# Patient Record
Sex: MALE | Race: WHITE | Employment: OTHER | URBAN - METROPOLITAN AREA
[De-identification: names, ages, dates, MRNs, and addresses within clinical notes are randomized per-mention and may not be internally consistent; named-entity substitution may affect disease eponyms.]

---

## 2017-01-02 ENCOUNTER — HOSPITAL ENCOUNTER (OUTPATIENT)
Dept: MRI IMAGING | Age: 42
Discharge: HOME | End: 2017-01-02
Attending: PHYSICIAN ASSISTANT
Payer: COMMERCIAL

## 2017-01-02 DIAGNOSIS — M48.061 LUMBAR FORAMINAL STENOSIS: ICD-10-CM

## 2017-01-02 DIAGNOSIS — C92.10 CML (CHRONIC MYELOID LEUKEMIA) (HCC): ICD-10-CM

## 2017-01-02 PROCEDURE — A9575 INJ GADOTERATE MEGLUMI 0.1ML: HCPCS | Performed by: PHYSICIAN ASSISTANT

## 2017-01-02 PROCEDURE — 72158 MRI LUMBAR SPINE W/O & W/DYE: CPT

## 2017-01-26 ENCOUNTER — TELEPHONE (OUTPATIENT)
Dept: FAMILY MEDICINE CLINIC | Facility: CLINIC | Age: 42
End: 2017-01-26

## 2017-01-26 NOTE — TELEPHONE ENCOUNTER
Patient called. Patient was unaware that he had an appt scheduled with spine surgeon's office. Per last MRI, it stated patient had upcoming appt already scheduled. Pt states he has no appt scheduled yet.  Informed patient he needs to call them and schedule

## 2017-02-20 PROBLEM — M51.369 BULGE OF LUMBAR DISC WITHOUT MYELOPATHY: Status: ACTIVE | Noted: 2017-02-20

## 2017-02-20 PROBLEM — M51.26 BULGE OF LUMBAR DISC WITHOUT MYELOPATHY: Status: ACTIVE | Noted: 2017-02-20

## 2017-02-20 PROBLEM — M54.16 LUMBAR RADICULITIS: Status: ACTIVE | Noted: 2017-02-20

## 2017-02-20 PROBLEM — M51.369 DDD (DEGENERATIVE DISC DISEASE), LUMBAR: Status: ACTIVE | Noted: 2017-02-20

## 2017-02-20 PROBLEM — M51.36 DDD (DEGENERATIVE DISC DISEASE), LUMBAR: Status: ACTIVE | Noted: 2017-02-20

## 2017-02-20 PROBLEM — M51.36 BULGE OF LUMBAR DISC WITHOUT MYELOPATHY: Status: ACTIVE | Noted: 2017-02-20

## 2017-02-27 ENCOUNTER — HOSPITAL ENCOUNTER (OUTPATIENT)
Age: 42
Discharge: HOME | End: 2017-02-27
Attending: FAMILY MEDICINE
Payer: COMMERCIAL

## 2017-02-27 ENCOUNTER — APPOINTMENT (OUTPATIENT)
Dept: GENERAL RADIOLOGY | Age: 42
End: 2017-02-27
Attending: FAMILY MEDICINE
Payer: COMMERCIAL

## 2017-02-27 VITALS
RESPIRATION RATE: 18 BRPM | TEMPERATURE: 100 F | OXYGEN SATURATION: 98 % | DIASTOLIC BLOOD PRESSURE: 83 MMHG | SYSTOLIC BLOOD PRESSURE: 132 MMHG | HEART RATE: 80 BPM

## 2017-02-27 DIAGNOSIS — S93.401A MODERATE ANKLE SPRAIN, RIGHT, INITIAL ENCOUNTER: Primary | ICD-10-CM

## 2017-02-27 PROCEDURE — 99204 OFFICE O/P NEW MOD 45 MIN: CPT

## 2017-02-27 PROCEDURE — 99213 OFFICE O/P EST LOW 20 MIN: CPT

## 2017-02-27 PROCEDURE — 73610 X-RAY EXAM OF ANKLE: CPT

## 2017-02-27 RX ORDER — IBUPROFEN 600 MG/1
600 TABLET ORAL EVERY 8 HOURS PRN
Qty: 30 TABLET | Refills: 0 | Status: SHIPPED | OUTPATIENT
Start: 2017-02-27 | End: 2017-03-06

## 2017-02-27 NOTE — ED INITIAL ASSESSMENT (HPI)
Pt arrived alert and responsive. Pt c/o rolled right ankle. Pt c/o pain, swelling and bruising right foot/ankle.

## 2017-02-27 NOTE — ED PROVIDER NOTES
Patient presents with:  Lower Extremity Injury (musculoskeletal)      HPI:     Derek Gonzalez is a 39year old male who presents today with a chief complaint of pain in the right lateral ankle  pain after twisting injury.  Onset of symptoms was abrupt s right ankle x1day Pt arrived alert and responsive. Pt c/o rolled right ankle. Pt c/o pain,    swelling and bruising right foot/ankle.      Order Specific Question: What is the Relevant Clinical Indication / Reason for Exam?  Answer: rolled right ankle x1day MD KARINE  [address] [phone]    In 1 week

## 2017-04-05 ENCOUNTER — OFFICE VISIT (OUTPATIENT)
Dept: HEMATOLOGY/ONCOLOGY | Age: 42
End: 2017-04-05
Attending: SPECIALIST
Payer: COMMERCIAL

## 2017-04-05 ENCOUNTER — HOSPITAL ENCOUNTER (OUTPATIENT)
Dept: PHYSICAL THERAPY | Facility: HOSPITAL | Age: 42
Setting detail: THERAPIES SERIES
Discharge: HOME | End: 2017-04-05
Attending: PHYSICIAN ASSISTANT
Payer: COMMERCIAL

## 2017-04-05 VITALS
SYSTOLIC BLOOD PRESSURE: 128 MMHG | WEIGHT: 146.81 LBS | HEART RATE: 81 BPM | TEMPERATURE: 98 F | RESPIRATION RATE: 18 BRPM | OXYGEN SATURATION: 98 % | BODY MASS INDEX: 22 KG/M2 | DIASTOLIC BLOOD PRESSURE: 80 MMHG

## 2017-04-05 DIAGNOSIS — M47.26 OSTEOARTHRITIS OF SPINE WITH RADICULOPATHY, LUMBAR REGION: ICD-10-CM

## 2017-04-05 DIAGNOSIS — M48.061 LUMBAR FORAMINAL STENOSIS: Primary | ICD-10-CM

## 2017-04-05 DIAGNOSIS — C92.10 CML (CHRONIC MYELOID LEUKEMIA) (HCC): Primary | ICD-10-CM

## 2017-04-05 PROCEDURE — 99213 OFFICE O/P EST LOW 20 MIN: CPT | Performed by: SPECIALIST

## 2017-04-05 PROCEDURE — 97161 PT EVAL LOW COMPLEX 20 MIN: CPT

## 2017-04-05 PROCEDURE — 97110 THERAPEUTIC EXERCISES: CPT

## 2017-04-05 NOTE — PROGRESS NOTES
SPINE EVALUATION:   Referring Physician: Dr. Thelbert Najjar  Diagnosis: lumbar foraminal stenosis     Date of Service: 4/5/2017     PATIENT SUMMARY   Annmarie Gallo is a 39year old y/o male who presents to therapy today with complaints of R lower leg pain d Therapy to address the above impairments to decrease pain and return to prior level of function.     Precautions:  None  OBJECTIVE:   Observation/Posture: decreased lumbar lordosis  Gait: WNL  Neuro Screen: normal    lumbar ROM:   Flexion: WNL  Extension: l pain-free stair ascent within 4 weeks. Patient will achieve LIZETH in FOTO score within 4 weeks to improve functional mobility. Frequency / Duration: Patient will be seen for 2 x/week or a total of 8 visits over a 90 day period.  Treatment will include: M

## 2017-04-05 NOTE — PROGRESS NOTES
Patient is here today for follow up with [facility] for Chronic Myeloid Leukemia. Patient denies pain. Patient is on Tasigna 300mg BID. Denies adverse side effects. Medication list and medical history were reviewed and updated.     Education Record    Learn

## 2017-04-10 ENCOUNTER — HOSPITAL ENCOUNTER (OUTPATIENT)
Dept: PHYSICAL THERAPY | Facility: HOSPITAL | Age: 42
Setting detail: THERAPIES SERIES
Discharge: HOME | End: 2017-04-10
Attending: PHYSICIAN ASSISTANT
Payer: COMMERCIAL

## 2017-04-10 PROCEDURE — 97140 MANUAL THERAPY 1/> REGIONS: CPT

## 2017-04-10 PROCEDURE — 97110 THERAPEUTIC EXERCISES: CPT

## 2017-04-12 ENCOUNTER — APPOINTMENT (OUTPATIENT)
Dept: HEMATOLOGY/ONCOLOGY | Age: 42
End: 2017-04-12
Attending: SPECIALIST
Payer: COMMERCIAL

## 2017-04-12 ENCOUNTER — APPOINTMENT (OUTPATIENT)
Dept: PHYSICAL THERAPY | Facility: HOSPITAL | Age: 42
End: 2017-04-12
Attending: PHYSICIAN ASSISTANT
Payer: COMMERCIAL

## 2017-04-14 ENCOUNTER — HOSPITAL ENCOUNTER (OUTPATIENT)
Dept: PHYSICAL THERAPY | Facility: HOSPITAL | Age: 42
Setting detail: THERAPIES SERIES
Discharge: HOME | End: 2017-04-14
Attending: PHYSICIAN ASSISTANT
Payer: COMMERCIAL

## 2017-04-14 PROCEDURE — 97140 MANUAL THERAPY 1/> REGIONS: CPT

## 2017-04-14 PROCEDURE — 97110 THERAPEUTIC EXERCISES: CPT

## 2017-04-14 NOTE — PROGRESS NOTES
Dx: lumbar foraminal stenosis          Authorized # of Visits:  8         Next MD visit: none scheduled  Fall Risk: standard         Precautions: n/a             Subjective: Patient states that he only had 1 instance of pain in the last 4 days.  Exercises h Era 2( 25 min) manual x1 ( 15 min)   Total Timed Treatment: 40 min     Total Treatment Time: 40 min

## 2017-04-16 NOTE — PROGRESS NOTES
[facility] Progress Note      Patient Name: Tegan York   YOB: 1975  Medical Record Number: [id number]  Attending Physician: Susan Merino M.D.      Date of Visit: 4/5/2017    Chief Complaint  Chronic myeloid leukemia - foll complaints. Past Medical History (historical data, reviewed)  CML (as above). Past Surgical History (historical data, reviewed)  None. Family History (historical data, reviewed)  No known family history of hematologic malignancy.   Patient has one edema.  Integumentary Skin is warm and dry. Neurologic Alert and oriented x 3; motor and sensory grossly intact. Psychiatric Mood and affect appropriate; coherent speech; verbalizes understanding of our discussions today.     Laboratory     Recent Results CARTRIDGE   Collection Time: 04/05/17  2:24 PM   Result Value Ref Range   ISTAT Sodium 139 136-144 mmol/L   ISTAT BUN 25 (H) 8-20 mg/dL   ISTAT Potassium 3.9 3.6-5.1 mmol/L   ISTAT Chloride 102 101-111 mmol/L   ISTAT Ionized Calcium 1.15 1.12-1.32 mmol/L

## 2017-04-17 ENCOUNTER — APPOINTMENT (OUTPATIENT)
Dept: PHYSICAL THERAPY | Facility: HOSPITAL | Age: 42
End: 2017-04-17
Attending: PHYSICIAN ASSISTANT
Payer: COMMERCIAL

## 2017-04-19 ENCOUNTER — APPOINTMENT (OUTPATIENT)
Dept: PHYSICAL THERAPY | Facility: HOSPITAL | Age: 42
End: 2017-04-19
Attending: PHYSICIAN ASSISTANT
Payer: COMMERCIAL

## 2017-04-21 ENCOUNTER — HOSPITAL ENCOUNTER (OUTPATIENT)
Dept: PHYSICAL THERAPY | Facility: HOSPITAL | Age: 42
Setting detail: THERAPIES SERIES
Discharge: HOME | End: 2017-04-21
Attending: PHYSICIAN ASSISTANT
Payer: COMMERCIAL

## 2017-04-21 PROCEDURE — 97140 MANUAL THERAPY 1/> REGIONS: CPT

## 2017-04-21 PROCEDURE — 97110 THERAPEUTIC EXERCISES: CPT

## 2017-04-21 NOTE — PROGRESS NOTES
Dx: lumbar foraminal stenosis          Authorized # of Visits:  8         Next MD visit: none scheduled  Fall Risk: standard         Precautions: n/a             Subjective: Patient states that his lower leg symptoms were worse over the first part of the w Lumbar mobility assessment with quadrants - full ROM no reproduction of leg symptoms                                                      Charges: Era 2( 25 min) manual x1 ( 15 min)   Total Timed Treatment: 40 min     Total Treatment Time: 40 min

## 2017-04-24 ENCOUNTER — HOSPITAL ENCOUNTER (OUTPATIENT)
Dept: PHYSICAL THERAPY | Facility: HOSPITAL | Age: 42
Setting detail: THERAPIES SERIES
Discharge: HOME | End: 2017-04-24
Attending: PHYSICIAN ASSISTANT
Payer: COMMERCIAL

## 2017-04-24 PROCEDURE — 97140 MANUAL THERAPY 1/> REGIONS: CPT

## 2017-04-24 PROCEDURE — 97110 THERAPEUTIC EXERCISES: CPT

## 2017-04-24 NOTE — PROGRESS NOTES
Dx: lumbar foraminal stenosis          Authorized # of Visits:  8         Next MD visit: none scheduled  Fall Risk: standard         Precautions: n/a             Subjective: Patient reports that his symptoms are about the same.  He still has pain with prolo gr IV+ Side lying lumbar rotation manipulation Prone press up, 2x10 (HEP)      Lateral step down, 8\" step, 3x10 (HEP) Single leg stance on airex, 3x30 sec Seated slump mobilization, 20 (HEP) X (no HEP)      Hip IR stretch, 3x20 sec Partial single leg squa

## 2017-04-26 ENCOUNTER — APPOINTMENT (OUTPATIENT)
Dept: PHYSICAL THERAPY | Facility: HOSPITAL | Age: 42
End: 2017-04-26
Attending: PHYSICIAN ASSISTANT
Payer: COMMERCIAL

## 2017-04-28 ENCOUNTER — HOSPITAL ENCOUNTER (OUTPATIENT)
Dept: PHYSICAL THERAPY | Facility: HOSPITAL | Age: 42
Setting detail: THERAPIES SERIES
Discharge: HOME | End: 2017-04-28
Attending: PHYSICIAN ASSISTANT
Payer: COMMERCIAL

## 2017-04-28 PROCEDURE — 97140 MANUAL THERAPY 1/> REGIONS: CPT

## 2017-04-28 PROCEDURE — 97110 THERAPEUTIC EXERCISES: CPT

## 2017-04-28 NOTE — PROGRESS NOTES
Dx: lumbar foraminal stenosis          Authorized # of Visits:  8         Next MD visit: none scheduled  Fall Risk: standard         Precautions: n/a             Subjective: Patient reports similar symptoms with standing since previous session.  He has been manipulation     Calf stretch, 3x30 sec (HEP) Distal fibula AP, gr IV+ Side lying lumbar rotation manipulation Prone press up, 2x10 (HEP) Thoracic paraspinal STM     Lateral step down, 8\" step, 3x10 (HEP) Single leg stance on airex, 3x30 sec Seated slump

## 2017-05-01 ENCOUNTER — HOSPITAL ENCOUNTER (OUTPATIENT)
Dept: PHYSICAL THERAPY | Facility: HOSPITAL | Age: 42
Setting detail: THERAPIES SERIES
Discharge: HOME | End: 2017-05-01
Attending: PHYSICIAN ASSISTANT
Payer: COMMERCIAL

## 2017-05-01 PROCEDURE — 97140 MANUAL THERAPY 1/> REGIONS: CPT

## 2017-05-01 PROCEDURE — 97110 THERAPEUTIC EXERCISES: CPT

## 2017-05-01 NOTE — PROGRESS NOTES
SPINE PROGRESS:   Referring Physician: Dr. Elana Jarvis  Diagnosis: lumbar foraminal stenosis     Date of Service: 5/1/2017     PATIENT SUMMARY   Julien Macias is a 39year old male who has been in therapy for 7 session with diagnosis of lumbar foraminal no pain at 40 sec post; L >20 sec no trunk lean    Today’s Treatment and Response:  Prone L4-5 R UPA in slight extension and R lateral flexion, gr III+  Side lying slump position w/ lumbar rotation  Prone press up w/ R bias, 68o1kek (HEP)  Seated slump str To:7/4/2017

## 2017-05-10 ENCOUNTER — HOSPITAL ENCOUNTER (OUTPATIENT)
Dept: PHYSICAL THERAPY | Facility: HOSPITAL | Age: 42
Setting detail: THERAPIES SERIES
Discharge: HOME | End: 2017-05-10
Attending: PHYSICIAN ASSISTANT
Payer: COMMERCIAL

## 2017-05-10 PROCEDURE — 97140 MANUAL THERAPY 1/> REGIONS: CPT

## 2017-05-10 PROCEDURE — 97110 THERAPEUTIC EXERCISES: CPT

## 2017-05-10 NOTE — PROGRESS NOTES
Dx: lumbar foraminal stenosis          Authorized # of Visits:  8         Next MD visit: none scheduled  Fall Risk: standard         Precautions: n/a             Subjective: Patient states that his symptoms are the same.  He continues to have pain first thi airex, 3x30 sec Seated slump mobilization, 20 (HEP) X (no HEP) Foam roll thoracic extension TrA isometrics    Hip IR stretch, 3x20 sec Partial single leg squat on airex, 3x8 (HEP) Lumbar mobility assessment with quadrants - full ROM no reproduction of leg

## 2017-05-17 ENCOUNTER — HOSPITAL ENCOUNTER (OUTPATIENT)
Dept: PHYSICAL THERAPY | Facility: HOSPITAL | Age: 42
Setting detail: THERAPIES SERIES
Discharge: HOME | End: 2017-05-17
Attending: PHYSICIAN ASSISTANT
Payer: COMMERCIAL

## 2017-05-17 PROCEDURE — 97140 MANUAL THERAPY 1/> REGIONS: CPT

## 2017-05-17 PROCEDURE — 97110 THERAPEUTIC EXERCISES: CPT

## 2017-05-17 NOTE — PROGRESS NOTES
Dx: lumbar foraminal stenosis          Authorized # of Visits:  8         Next MD visit: none scheduled  Fall Risk: standard         Precautions: n/a             Subjective: Patient had less pain over the weekend being on his feet, although he continues to Side lying lumbar rotation manipulation Prone press up, 2x10 (HEP) Thoracic paraspinal STM Long sitting slump stretch, 2x15 (HEP) Standing hip adduction stretch   Lateral step down, 8\" step, 3x10 (HEP) Single leg stance on airex, 3x30 sec Seated slump mob

## 2017-06-22 ENCOUNTER — OFFICE VISIT (OUTPATIENT)
Dept: FAMILY MEDICINE CLINIC | Facility: CLINIC | Age: 42
End: 2017-06-22

## 2017-06-22 VITALS
WEIGHT: 148 LBS | BODY MASS INDEX: 22 KG/M2 | SYSTOLIC BLOOD PRESSURE: 120 MMHG | OXYGEN SATURATION: 100 % | RESPIRATION RATE: 12 BRPM | HEART RATE: 69 BPM | TEMPERATURE: 98 F | DIASTOLIC BLOOD PRESSURE: 68 MMHG

## 2017-06-22 DIAGNOSIS — G89.29 CHRONIC LOW BACK PAIN, UNSPECIFIED BACK PAIN LATERALITY, WITH SCIATICA PRESENCE UNSPECIFIED: Primary | ICD-10-CM

## 2017-06-22 DIAGNOSIS — M54.5 CHRONIC LOW BACK PAIN, UNSPECIFIED BACK PAIN LATERALITY, WITH SCIATICA PRESENCE UNSPECIFIED: Primary | ICD-10-CM

## 2017-06-22 PROCEDURE — 99214 OFFICE O/P EST MOD 30 MIN: CPT | Performed by: FAMILY MEDICINE

## 2017-06-22 NOTE — PROGRESS NOTES
HPI:    Patient ID: Debi Salas is a 39year old male. HPI  Patient is here to get up-to-date on his chronic low back pain. He had seen Bowen Pop who is physician assistant for Dr. Kennedy Serum spine surgery.   Conservative treatment was recommended at patient seen out of network doctors. Will let patient know shortly. No orders of the defined types were placed in this encounter.        Meds This Visit:  No prescriptions requested or ordered in this encounter    Imaging & Referrals:  None       #[id number]

## 2017-10-11 ENCOUNTER — TELEPHONE (OUTPATIENT)
Dept: FAMILY MEDICINE CLINIC | Facility: CLINIC | Age: 42
End: 2017-10-11

## 2017-10-11 DIAGNOSIS — C92.10 CML (CHRONIC MYELOID LEUKEMIA) (HCC): Primary | ICD-10-CM

## 2017-10-11 NOTE — TELEPHONE ENCOUNTER
Yen Garcia from Dr. Rachel Wright office calling to get a referral for patient. He has an appt on October 18.

## 2017-10-18 ENCOUNTER — APPOINTMENT (OUTPATIENT)
Dept: HEMATOLOGY/ONCOLOGY | Age: 42
End: 2017-10-18
Attending: SPECIALIST
Payer: COMMERCIAL

## 2017-10-19 ENCOUNTER — OFFICE VISIT (OUTPATIENT)
Dept: HEMATOLOGY/ONCOLOGY | Facility: HOSPITAL | Age: 42
End: 2017-10-19
Attending: FAMILY MEDICINE
Payer: COMMERCIAL

## 2017-10-19 VITALS
WEIGHT: 150.63 LBS | DIASTOLIC BLOOD PRESSURE: 74 MMHG | HEIGHT: 69 IN | RESPIRATION RATE: 18 BRPM | BODY MASS INDEX: 22.31 KG/M2 | TEMPERATURE: 98 F | OXYGEN SATURATION: 98 % | HEART RATE: 59 BPM | SYSTOLIC BLOOD PRESSURE: 144 MMHG

## 2017-10-19 DIAGNOSIS — C92.10 CML (CHRONIC MYELOID LEUKEMIA) (HCC): Primary | ICD-10-CM

## 2017-10-19 PROCEDURE — 99213 OFFICE O/P EST LOW 20 MIN: CPT | Performed by: SPECIALIST

## 2017-10-19 NOTE — PROGRESS NOTES
Pt here for 6 month MD f/u for hx of CML. Currently on Tasigna 300 mg BID. Pt denies any issues or complaints at this time.      Education Record    Learner:  Patient    Disease / Diagnosis: CML    Barriers / Limitations:  None   Comments:    Method:  Discu

## 2017-10-19 NOTE — PROGRESS NOTES
[facility] Progress Note      Patient Name: Ailyn Bae   YOB: 1975  Medical Record Number: [id number]  Attending Physician: Laila Johnson M.D.      Date of Visit: 10/19/2017    Chief Complaint  Chronic myeloid leukemia - fo complaints. Past Medical History (historical data, reviewed)  CML (as above). Past Surgical History (historical data, reviewed)  None. Family History (historical data, reviewed)  No known family history of hematologic malignancy.   Patient has one normoactive bowel sounds. No hepatosplenomegaly. Extremities No lower extremity edema. Integumentary Skin is warm and dry. Neurologic Alert and oriented x 3; motor and sensory grossly intact.   Psychiatric Mood and affect appropriate; coherent speech; v months. Continue therapy without modification. Discussed with patient data from [place] showing that patients with major molecular response can discontinue therapy and be followed without treatment.  However, I would recommend at least 5 years of therapy bef

## 2017-10-23 ENCOUNTER — TELEPHONE (OUTPATIENT)
Dept: FAMILY MEDICINE CLINIC | Facility: CLINIC | Age: 42
End: 2017-10-23

## 2017-10-23 NOTE — TELEPHONE ENCOUNTER
Patient would like referral to [address] [place] [address]. He states that he has discussed this in the past with Dr. Kuldip Saenz.

## 2017-10-24 NOTE — TELEPHONE ENCOUNTER
Ok to refer to [facility] [employer]),  insurance may not cover however. Jose Sue, can you check on this and talk to patient.   He is having significant problems with his back and I did discuss with him in the past regarding him wanting to see doctors at [facility] s

## 2017-10-30 NOTE — TELEPHONE ENCOUNTER
I spoke the patient and explained that putting in a referral to a out of network spine center will not be approved.  We could try [facility] or [facility] spine center and put in a referral. Patient is going to do some research and get back to me on which one h

## 2017-10-30 NOTE — TELEPHONE ENCOUNTER
Pt is calling about referral and states he has not gotten a call back or a vm regarding referral. Pt would like another call and ok to m

## 2017-10-31 ENCOUNTER — TELEPHONE (OUTPATIENT)
Dept: FAMILY MEDICINE CLINIC | Facility: CLINIC | Age: 42
End: 2017-10-31

## 2017-11-07 DIAGNOSIS — M54.16 LUMBAR RADICULITIS: ICD-10-CM

## 2017-11-07 DIAGNOSIS — M51.36 DDD (DEGENERATIVE DISC DISEASE), LUMBAR: Primary | ICD-10-CM

## 2017-11-07 DIAGNOSIS — M51.26 BULGE OF LUMBAR DISC WITHOUT MYELOPATHY: ICD-10-CM

## 2017-11-14 ENCOUNTER — PATIENT MESSAGE (OUTPATIENT)
Dept: FAMILY MEDICINE CLINIC | Facility: CLINIC | Age: 42
End: 2017-11-14

## 2017-11-14 DIAGNOSIS — M54.16 LUMBAR RADICULITIS: ICD-10-CM

## 2017-11-14 DIAGNOSIS — M51.36 DDD (DEGENERATIVE DISC DISEASE), LUMBAR: Primary | ICD-10-CM

## 2017-11-14 DIAGNOSIS — M51.26 BULGE OF LUMBAR DISC WITHOUT MYELOPATHY: ICD-10-CM

## 2017-11-27 NOTE — TELEPHONE ENCOUNTER
From: Stephanie Mcknight CMA  To: Mica Pozo  Sent: 11/14/2017 2:16 PM CST  Subject: referral    Hi Selwyn,  So they finally approved the referral to see specialist through [facility].   Are you able to see the referral?  Corazon England

## 2017-12-04 NOTE — TELEPHONE ENCOUNTER
PT is requesting that referral placed on 11/07/17 for Alejandra Jurado, for Dr Jeffrey Lesches to be switched to Dr Dian Houser. Please update referral       Luke Elizabeth M.D.   Spine, Back and Neck  Professor, Department of Orthopaedic Surgery, [facility] [place]

## 2017-12-12 ENCOUNTER — PATIENT MESSAGE (OUTPATIENT)
Dept: FAMILY MEDICINE CLINIC | Facility: CLINIC | Age: 42
End: 2017-12-12

## 2018-01-15 ENCOUNTER — TELEPHONE (OUTPATIENT)
Dept: FAMILY MEDICINE CLINIC | Facility: CLINIC | Age: 43
End: 2018-01-15

## 2018-01-15 DIAGNOSIS — M54.5 CHRONIC LOW BACK PAIN, UNSPECIFIED BACK PAIN LATERALITY, WITH SCIATICA PRESENCE UNSPECIFIED: ICD-10-CM

## 2018-01-15 DIAGNOSIS — M54.16 LUMBAR RADICULAR PAIN: Primary | ICD-10-CM

## 2018-01-15 DIAGNOSIS — G89.29 CHRONIC LOW BACK PAIN, UNSPECIFIED BACK PAIN LATERALITY, WITH SCIATICA PRESENCE UNSPECIFIED: ICD-10-CM

## 2018-01-15 NOTE — TELEPHONE ENCOUNTER
LOV: 06/22/17  ASSESSMENT/PLAN:   No diagnosis found. Chronic low back pain long discussion with him regarding possible treatment options. I think it is reasonable to get opinion from [facility] [place].   Patient has [employer] and will find out ho

## 2018-01-15 NOTE — TELEPHONE ENCOUNTER
Spoke to pt and informed of below, pt verbalized understanding and will contact central scheduling to schedule MRI.

## 2018-01-18 ENCOUNTER — HOSPITAL ENCOUNTER (OUTPATIENT)
Dept: MRI IMAGING | Age: 43
Discharge: HOME | End: 2018-01-18
Attending: FAMILY MEDICINE
Payer: COMMERCIAL

## 2018-01-18 DIAGNOSIS — G89.29 CHRONIC LOW BACK PAIN, UNSPECIFIED BACK PAIN LATERALITY, WITH SCIATICA PRESENCE UNSPECIFIED: ICD-10-CM

## 2018-01-18 DIAGNOSIS — M54.16 LUMBAR RADICULAR PAIN: ICD-10-CM

## 2018-01-18 DIAGNOSIS — M54.5 CHRONIC LOW BACK PAIN, UNSPECIFIED BACK PAIN LATERALITY, WITH SCIATICA PRESENCE UNSPECIFIED: ICD-10-CM

## 2018-01-18 PROCEDURE — 72148 MRI LUMBAR SPINE W/O DYE: CPT | Performed by: FAMILY MEDICINE

## 2018-01-19 NOTE — PROGRESS NOTES
Multilevel degenerative changes in the lumbar spine  Moderate left neural foraminal stenosis L4-5, and R and L neural foraminal stenosis L5-S1  Looking through chart it looks like pt would like to see specialist at [facility], I think that is a good idea

## 2018-01-25 ENCOUNTER — TELEPHONE (OUTPATIENT)
Dept: FAMILY MEDICINE CLINIC | Facility: CLINIC | Age: 43
End: 2018-01-25

## 2018-01-25 DIAGNOSIS — M54.16 LUMBAR RADICULOPATHY: ICD-10-CM

## 2018-01-25 DIAGNOSIS — M51.36 DDD (DEGENERATIVE DISC DISEASE), LUMBAR: Primary | ICD-10-CM

## 2018-01-25 DIAGNOSIS — M51.26 BULGE OF LUMBAR DISC WITHOUT MYELOPATHY: ICD-10-CM

## 2018-01-25 NOTE — TELEPHONE ENCOUNTER
[facility] Clinical Staff   Cc: [facility] Referral Pool   Phone Number: [phone]             Please put in another referral for patient to see Dr. Therese Contreras at [facility] at [facility] was given one visit to see this spec

## 2018-01-25 NOTE — TELEPHONE ENCOUNTER
Pt states there is an issue with the referral for Dr. Hao Jeffery. He is confused about why he needs to go there.

## 2018-01-25 NOTE — TELEPHONE ENCOUNTER
Advised pt that we need Dr Miguel Brown office notes sent to us in order for us to place the referral.     Hypecal message sent

## 2018-01-25 NOTE — TELEPHONE ENCOUNTER
I spoke with patient. He was referred to Dr. Zuleyka Eagle for his back he did go to the initial consult. He was scheduled to see him today as f/u and when he arrived, he was told he only had approval for the one visit.   That office told patient it was our [facility]

## 2018-01-25 NOTE — TELEPHONE ENCOUNTER
[place] THAPA April / Yasmany Owens,     I was told by the [employer]erral [employer] whom Yasmany Owens had me call today after an appointment was deemed no good with Dr. Bin Brown that you must put in a new referral and [facility].  She also suggested that for ea

## 2018-01-25 NOTE — TELEPHONE ENCOUNTER
From: Corazon Vincent, EUSEBIO  To: Debi Salas  Sent: 12/12/2017 10:04 AM CST  Subject: Referrel     Jeison Coleman,     Your insurance company updated the approved referral for Dr. Abigail Bird. Let us know if we can help with anything else.      Thank you,

## 2018-01-29 NOTE — TELEPHONE ENCOUNTER
I called Dr. Rodriguez [facility] office at [phone] to obtain medical records so we may process his referral asap. The office will fax me notes to triage fax. I spoke with Willie Card there. I did receive paperwork and entered information into referral as stat.   Shellie

## 2018-02-06 ENCOUNTER — PATIENT MESSAGE (OUTPATIENT)
Dept: FAMILY MEDICINE CLINIC | Facility: CLINIC | Age: 43
End: 2018-02-06

## 2018-02-06 RX ORDER — OSELTAMIVIR PHOSPHATE 75 MG/1
75 CAPSULE ORAL 2 TIMES DAILY
Qty: 10 CAPSULE | Refills: 0 | Status: SHIPPED | OUTPATIENT
Start: 2018-02-06 | End: 2018-02-27

## 2018-02-06 NOTE — TELEPHONE ENCOUNTER
Patient states his son is positive for influenza and he has started to exhibit symptoms this am: fatigue and body aches. He would like to know if you would treat him for the flu?

## 2018-02-06 NOTE — TELEPHONE ENCOUNTER
From: Annmarie Gallo  To: Harley Potts MD  Sent: 2/6/2018 8:45 AM CST  Subject: Prescription Question    Good Morning, my son Lorelei Slater has the Influenza virus and we are fighting off getting it.  Home with him today and he's had it for 4 days and tod

## 2018-02-19 ENCOUNTER — TELEPHONE (OUTPATIENT)
Dept: FAMILY MEDICINE CLINIC | Facility: CLINIC | Age: 43
End: 2018-02-19

## 2018-02-26 ENCOUNTER — TELEPHONE (OUTPATIENT)
Dept: FAMILY MEDICINE CLINIC | Facility: CLINIC | Age: 43
End: 2018-02-26

## 2018-02-26 ENCOUNTER — LAB ENCOUNTER (OUTPATIENT)
Dept: LAB | Age: 43
End: 2018-02-26
Attending: ORTHOPAEDIC SURGERY
Payer: COMMERCIAL

## 2018-02-26 DIAGNOSIS — Z01.818 PRE-OPERATIVE CLEARANCE: Primary | ICD-10-CM

## 2018-02-26 DIAGNOSIS — M51.37 DDD (DEGENERATIVE DISC DISEASE), LUMBOSACRAL: Primary | ICD-10-CM

## 2018-02-26 DIAGNOSIS — M48.062 SPINAL STENOSIS OF LUMBAR REGION WITH NEUROGENIC CLAUDICATION: ICD-10-CM

## 2018-02-26 DIAGNOSIS — M54.16 LUMBAR RADICULOPATHY: ICD-10-CM

## 2018-02-26 DIAGNOSIS — M51.36 DEGENERATION OF LUMBAR INTERVERTEBRAL DISC: ICD-10-CM

## 2018-02-26 LAB
ALBUMIN SERPL-MCNC: 4.1 G/DL (ref 3.5–4.8)
ALP LIVER SERPL-CCNC: 75 U/L (ref 45–117)
ALT SERPL-CCNC: 30 U/L (ref 17–63)
APTT PPP: 28.7 SECONDS (ref 25–34)
AST SERPL-CCNC: 22 U/L (ref 15–41)
BASOPHILS # BLD AUTO: 0.03 X10(3) UL (ref 0–0.1)
BASOPHILS NFR BLD AUTO: 0.6 %
BILIRUB SERPL-MCNC: 0.8 MG/DL (ref 0.1–2)
BILIRUB UR QL STRIP.AUTO: NEGATIVE
BUN BLD-MCNC: 16 MG/DL (ref 8–20)
CALCIUM BLD-MCNC: 9.1 MG/DL (ref 8.3–10.3)
CHLORIDE: 107 MMOL/L (ref 101–111)
CLARITY UR REFRACT.AUTO: CLEAR
CO2: 28 MMOL/L (ref 22–32)
COLOR UR AUTO: YELLOW
CREAT BLD-MCNC: 0.79 MG/DL (ref 0.7–1.3)
EOSINOPHIL # BLD AUTO: 0.08 X10(3) UL (ref 0–0.3)
EOSINOPHIL NFR BLD AUTO: 1.6 %
ERYTHROCYTE [DISTWIDTH] IN BLOOD BY AUTOMATED COUNT: 12.4 % (ref 11.5–16)
GLUCOSE BLD-MCNC: 87 MG/DL (ref 70–99)
GLUCOSE UR STRIP.AUTO-MCNC: NEGATIVE MG/DL
HCT VFR BLD AUTO: 43.2 % (ref 37–53)
HGB BLD-MCNC: 14.7 G/DL (ref 13–17)
IMMATURE GRANULOCYTE COUNT: 0.02 X10(3) UL (ref 0–1)
IMMATURE GRANULOCYTE RATIO %: 0.4 %
INR BLD: 1.04 (ref 0.89–1.12)
KETONES UR STRIP.AUTO-MCNC: NEGATIVE MG/DL
LEUKOCYTE ESTERASE UR QL STRIP.AUTO: NEGATIVE
LYMPHOCYTES # BLD AUTO: 1.24 X10(3) UL (ref 0.9–4)
LYMPHOCYTES NFR BLD AUTO: 25.1 %
M PROTEIN MFR SERPL ELPH: 7.2 G/DL (ref 6.1–8.3)
MCH RBC QN AUTO: 30.2 PG (ref 27–33.2)
MCHC RBC AUTO-ENTMCNC: 34 G/DL (ref 31–37)
MCV RBC AUTO: 88.9 FL (ref 80–99)
MONOCYTES # BLD AUTO: 0.54 X10(3) UL (ref 0.1–1)
MONOCYTES NFR BLD AUTO: 10.9 %
NEUTROPHIL ABS PRELIM: 3.04 X10 (3) UL (ref 1.3–6.7)
NEUTROPHILS # BLD AUTO: 3.04 X10(3) UL (ref 1.3–6.7)
NEUTROPHILS NFR BLD AUTO: 61.4 %
NITRITE UR QL STRIP.AUTO: NEGATIVE
PH UR STRIP.AUTO: 7 [PH] (ref 4.5–8)
PLATELET # BLD AUTO: 265 10(3)UL (ref 150–450)
POTASSIUM SERPL-SCNC: 4.5 MMOL/L (ref 3.6–5.1)
PROT UR STRIP.AUTO-MCNC: NEGATIVE MG/DL
PSA SERPL DL<=0.01 NG/ML-MCNC: 13.3 SECONDS (ref 11.8–14.1)
RBC # BLD AUTO: 4.86 X10(6)UL (ref 4.3–5.7)
RBC UR QL AUTO: NEGATIVE
RED CELL DISTRIBUTION WIDTH-SD: 39.9 FL (ref 35.1–46.3)
SODIUM SERPL-SCNC: 140 MMOL/L (ref 136–144)
SP GR UR STRIP.AUTO: 1.01 (ref 1–1.03)
UROBILINOGEN UR STRIP.AUTO-MCNC: <2 MG/DL
WBC # BLD AUTO: 5 X10(3) UL (ref 4–13)

## 2018-02-26 PROCEDURE — 85730 THROMBOPLASTIN TIME PARTIAL: CPT

## 2018-02-26 PROCEDURE — 36415 COLL VENOUS BLD VENIPUNCTURE: CPT

## 2018-02-26 PROCEDURE — 81003 URINALYSIS AUTO W/O SCOPE: CPT

## 2018-02-26 PROCEDURE — 80053 COMPREHEN METABOLIC PANEL: CPT

## 2018-02-26 PROCEDURE — 85610 PROTHROMBIN TIME: CPT

## 2018-02-26 PROCEDURE — 85025 COMPLETE CBC W/AUTO DIFF WBC: CPT

## 2018-02-26 NOTE — TELEPHONE ENCOUNTER
Pt is having surgery is on 3/6/2018 and Analy Orr is looking for the group approval for the surgery. She faxed paperwork to [facility] on 2/14/2018 and Alex Alas called Nell Gates back to confirm dr. Asaf Flor received the papers.      She is asking the status of the refe

## 2018-02-27 ENCOUNTER — OFFICE VISIT (OUTPATIENT)
Dept: FAMILY MEDICINE CLINIC | Facility: CLINIC | Age: 43
End: 2018-02-27

## 2018-02-27 VITALS
HEART RATE: 60 BPM | WEIGHT: 149 LBS | DIASTOLIC BLOOD PRESSURE: 76 MMHG | SYSTOLIC BLOOD PRESSURE: 122 MMHG | TEMPERATURE: 99 F | BODY MASS INDEX: 22.07 KG/M2 | HEIGHT: 69 IN | RESPIRATION RATE: 16 BRPM

## 2018-02-27 DIAGNOSIS — M54.16 LUMBAR RADICULITIS: ICD-10-CM

## 2018-02-27 DIAGNOSIS — M51.26 BULGE OF LUMBAR DISC WITHOUT MYELOPATHY: Primary | ICD-10-CM

## 2018-02-27 DIAGNOSIS — Z01.818 PREOP GENERAL PHYSICAL EXAM: ICD-10-CM

## 2018-02-27 PROCEDURE — 99214 OFFICE O/P EST MOD 30 MIN: CPT | Performed by: FAMILY MEDICINE

## 2018-02-27 NOTE — TELEPHONE ENCOUNTER
L/M oskar Shi for pt upcoming surgery paperwork. What paperwork does she need us to fax? Awaiting return call.

## 2018-02-27 NOTE — TELEPHONE ENCOUNTER
John Diaz called back from the Dr. Bradley Dean office. She said all the information for the surgery was faxed to us before.  Since the surgery is being done at [facility] we have to enter the referral.  I did as stat with all information and coding provided by Dr. Jackson Left

## 2018-02-27 NOTE — PROGRESS NOTES
Renee Clark is a 43year old male who presents for a pre-operative physical exam.   Renee Clark is scheduled for a lumbar fusion procedure on 3/6/18 performed by Dr Errol Faith.  Indication: chronic low back and right leg pain    HPI related to surg no abnormal aortic pulsation. LABORATORY DATA:   Per surgeon. ASSESSMENT AND PLAN:   Debi Salas has no significant history of cardiac or pulmonary conditions. He is a good surgical candidate.  This consult was sent back the referring phy

## 2018-02-28 ENCOUNTER — TELEPHONE (OUTPATIENT)
Dept: FAMILY MEDICINE CLINIC | Facility: CLINIC | Age: 43
End: 2018-02-28

## 2018-02-28 NOTE — TELEPHONE ENCOUNTER
Message   Received: Today   Message Contents   Ankit Valdez MD  [facility] Clinical Staff             Please send my note from 2/27/18 to Dr. Araceli Mir for preop clearance.

## 2018-02-28 NOTE — TELEPHONE ENCOUNTER
Progress note printed and given to [facility] to fax. Fax: [phone] to Ethan Ramirez at [facility]. Confirmation completed and will place in Trinidad's blue accordion until surgery completed.

## 2018-03-01 NOTE — TELEPHONE ENCOUNTER
Received approval from Referral dept, referral faxed to [place], FAx [phone], confirmation received

## 2018-04-11 ENCOUNTER — OFFICE VISIT (OUTPATIENT)
Dept: HEMATOLOGY/ONCOLOGY | Age: 43
End: 2018-04-11
Attending: FAMILY MEDICINE
Payer: COMMERCIAL

## 2018-04-11 ENCOUNTER — TELEPHONE (OUTPATIENT)
Dept: FAMILY MEDICINE CLINIC | Facility: CLINIC | Age: 43
End: 2018-04-11

## 2018-04-11 VITALS
OXYGEN SATURATION: 98 % | DIASTOLIC BLOOD PRESSURE: 79 MMHG | RESPIRATION RATE: 16 BRPM | WEIGHT: 145.81 LBS | HEART RATE: 80 BPM | TEMPERATURE: 99 F | SYSTOLIC BLOOD PRESSURE: 127 MMHG | BODY MASS INDEX: 22 KG/M2

## 2018-04-11 DIAGNOSIS — Z98.1 ARTHRODESIS STATUS: ICD-10-CM

## 2018-04-11 DIAGNOSIS — C92.10 CML (CHRONIC MYELOID LEUKEMIA) (HCC): Primary | ICD-10-CM

## 2018-04-11 DIAGNOSIS — Z98.1 HISTORY OF LUMBAR FUSION: Primary | ICD-10-CM

## 2018-04-11 PROCEDURE — 99213 OFFICE O/P EST LOW 20 MIN: CPT | Performed by: SPECIALIST

## 2018-04-11 NOTE — TELEPHONE ENCOUNTER
Fax received from patient stating he was to get PT per Dr. Therese Contreras at [facility].   He wanted to go to [place] [address] but must go through [facility] and we have to input referral for approval.  Order is to Eval and Treat 3 x week for 6 weeks total visit 18  Diagnosis

## 2018-04-11 NOTE — PROGRESS NOTES
Follow up with [facility] Ferrara for Chronic Myeloid Leukemia. Patient denies pain today. Had lumbar fusion 3/6/2018. Currently on Tasigna 300mg BID. Patient denies adverse side effects. Medication list, medical history and toxicities were reviewed and updated.

## 2018-04-16 NOTE — TELEPHONE ENCOUNTER
Patient informed of his approval for PT with [facility]. He would like phone number sent on my chart. Done.

## 2018-04-19 ENCOUNTER — HOSPITAL ENCOUNTER (OUTPATIENT)
Dept: PHYSICAL THERAPY | Facility: HOSPITAL | Age: 43
Setting detail: THERAPIES SERIES
Discharge: HOME | End: 2018-04-19
Attending: FAMILY MEDICINE
Payer: COMMERCIAL

## 2018-04-19 DIAGNOSIS — Z98.1 HISTORY OF LUMBAR FUSION: ICD-10-CM

## 2018-04-19 DIAGNOSIS — Z98.1 ARTHRODESIS STATUS: ICD-10-CM

## 2018-04-19 PROCEDURE — 97161 PT EVAL LOW COMPLEX 20 MIN: CPT

## 2018-04-19 PROCEDURE — 97110 THERAPEUTIC EXERCISES: CPT

## 2018-04-19 NOTE — PROGRESS NOTES
Physical Therapy  EVALUATION:   Referring Physician: Dr. Kemi Sanchez  Diagnosis: s/p lumbar fusion  Date of Service: 4/19/2018     PATIENT SUMMARY   Reddy Lynn is a 43year old y/o male who presents to therapy today with complaints of L foot numbness program: beginning with circuit ex at low resistance and with supportive seating, elliptical or TM at 2% grade for ambulation and not jogging. Plank ex elbows/knees> elbows toes.  Instructed and performed liana MILTON 1B, short range LTR in hooklying, DF stre From: 4/19/2018  To:7/18/2018

## 2018-04-23 ENCOUNTER — HOSPITAL ENCOUNTER (OUTPATIENT)
Dept: PHYSICAL THERAPY | Facility: HOSPITAL | Age: 43
Setting detail: THERAPIES SERIES
Discharge: HOME | End: 2018-04-23
Attending: FAMILY MEDICINE
Payer: COMMERCIAL

## 2018-04-23 PROCEDURE — 97110 THERAPEUTIC EXERCISES: CPT

## 2018-04-23 PROCEDURE — 97140 MANUAL THERAPY 1/> REGIONS: CPT

## 2018-04-23 NOTE — PROGRESS NOTES
Dx: s/p lumbar fusion       Authorized # of Visits:  8        Next MD visit: none scheduled  Fall Risk: standard         Precautions: n/a             Subjective: slept 4 hours. Did ex w/o difficulty. Contacted surgeon's office re: L foot symptoms.  Pt w/ qu

## 2018-04-26 ENCOUNTER — HOSPITAL ENCOUNTER (OUTPATIENT)
Dept: PHYSICAL THERAPY | Facility: HOSPITAL | Age: 43
Setting detail: THERAPIES SERIES
Discharge: HOME | End: 2018-04-26
Attending: FAMILY MEDICINE
Payer: COMMERCIAL

## 2018-04-26 PROCEDURE — 97110 THERAPEUTIC EXERCISES: CPT

## 2018-04-26 PROCEDURE — 97140 MANUAL THERAPY 1/> REGIONS: CPT

## 2018-04-26 NOTE — PROGRESS NOTES
Dx: s/p lumbar fusion       Authorized # of Visits:  8        Next MD visit: none scheduled  Fall Risk: standard         Precautions: n/a             Subjective:working on planks and hamstring flexibility.  Has had a couple nights where sleep seemed a littl

## 2018-04-30 ENCOUNTER — HOSPITAL ENCOUNTER (OUTPATIENT)
Dept: PHYSICAL THERAPY | Facility: HOSPITAL | Age: 43
Setting detail: THERAPIES SERIES
Discharge: HOME | End: 2018-04-30
Attending: FAMILY MEDICINE
Payer: COMMERCIAL

## 2018-04-30 PROCEDURE — 97110 THERAPEUTIC EXERCISES: CPT

## 2018-04-30 PROCEDURE — 97140 MANUAL THERAPY 1/> REGIONS: CPT

## 2018-04-30 NOTE — PROGRESS NOTES
Dx: s/p lumbar fusion       Authorized # of Visits:  8        Next MD visit: none scheduled  Fall Risk: standard         Precautions: n/a             Subjective:reports noting some sciatic pain L with drive in car this morning.  Reports no improvement foot

## 2018-05-03 ENCOUNTER — HOSPITAL ENCOUNTER (OUTPATIENT)
Dept: PHYSICAL THERAPY | Facility: HOSPITAL | Age: 43
Setting detail: THERAPIES SERIES
Discharge: HOME | End: 2018-05-03
Attending: FAMILY MEDICINE
Payer: COMMERCIAL

## 2018-05-03 PROCEDURE — 97140 MANUAL THERAPY 1/> REGIONS: CPT

## 2018-05-03 PROCEDURE — 97110 THERAPEUTIC EXERCISES: CPT

## 2018-05-03 NOTE — PROGRESS NOTES
Dx: s/p lumbar fusion       Authorized # of Visits:  8        Next MD visit: none scheduled  Fall Risk: standard         Precautions: n/a             Subjective:No sciatic pain. Some low back discomfort but had to change an SUV tire today.  Reports leia a

## 2018-05-07 ENCOUNTER — APPOINTMENT (OUTPATIENT)
Dept: PHYSICAL THERAPY | Facility: HOSPITAL | Age: 43
End: 2018-05-07
Attending: FAMILY MEDICINE
Payer: COMMERCIAL

## 2018-05-10 ENCOUNTER — HOSPITAL ENCOUNTER (OUTPATIENT)
Dept: PHYSICAL THERAPY | Facility: HOSPITAL | Age: 43
Setting detail: THERAPIES SERIES
Discharge: HOME | End: 2018-05-10
Attending: FAMILY MEDICINE
Payer: COMMERCIAL

## 2018-05-10 PROCEDURE — 97530 THERAPEUTIC ACTIVITIES: CPT

## 2018-05-10 PROCEDURE — 97110 THERAPEUTIC EXERCISES: CPT

## 2018-05-10 NOTE — PROGRESS NOTES
Dx: s/p lumbar fusion       Authorized # of Visits:  8        Next MD visit: none scheduled  Fall Risk: standard         Precautions: n/a             Subjective: Foot felt good last night. Has been using foot massager. No other sciatic symptoms.  Inquiring

## 2018-05-13 NOTE — PROGRESS NOTES
[facility] Progress Note      Patient Name: Mumtaz Tarango   YOB: 1975  Medical Record Number: [id number]  Attending Physician: Bettye Naqvi M.D.      Date of Visit: 10/19/2017    Chief Complaint  Chronic myeloid leukemia - fo complaints. Past Medical History (historical data, reviewed)  CML (as above). Past Surgical History (historical data, reviewed)  None. Family History (historical data, reviewed)  No known family history of hematologic malignancy.   Patient has one sounds. No hepatosplenomegaly. Extremities No lower extremity edema. Integumentary Skin is warm and dry. Neurologic Alert and oriented x 3; motor and sensory grossly intact.   Psychiatric Mood and affect appropriate; coherent speech; verbalizes Emily 0. 04 0.00 - 1.00 x10(3) uL   Neutrophil % 62.3 %   Lymphocyte % 23.9 %   Monocyte % 9.7 %   Eosinophil % 2.9 %   Basophil % 0.5 %   Immature Granulocyte % 0.7 %          Impression and Plan   1.    CML: Patient has chronic phase disease with a low Minor sco

## 2018-05-14 ENCOUNTER — APPOINTMENT (OUTPATIENT)
Dept: PHYSICAL THERAPY | Facility: HOSPITAL | Age: 43
End: 2018-05-14
Attending: FAMILY MEDICINE
Payer: COMMERCIAL

## 2018-05-17 ENCOUNTER — HOSPITAL ENCOUNTER (OUTPATIENT)
Dept: PHYSICAL THERAPY | Facility: HOSPITAL | Age: 43
Setting detail: THERAPIES SERIES
Discharge: HOME | End: 2018-05-17
Attending: FAMILY MEDICINE
Payer: COMMERCIAL

## 2018-05-17 PROCEDURE — 97110 THERAPEUTIC EXERCISES: CPT

## 2018-05-17 PROCEDURE — 97140 MANUAL THERAPY 1/> REGIONS: CPT

## 2018-05-17 NOTE — PROGRESS NOTES
Dx: s/p lumbar fusion       Authorized # of Visits:  8        Next MD visit: none scheduled  Fall Risk: standard         Precautions: n/a             Subjective:foot feeling good. Not seeing a change in hamstring extensibility.  Has done a little brisk walk

## 2018-05-31 ENCOUNTER — HOSPITAL ENCOUNTER (OUTPATIENT)
Dept: PHYSICAL THERAPY | Facility: HOSPITAL | Age: 43
Setting detail: THERAPIES SERIES
Discharge: HOME | End: 2018-05-31
Attending: FAMILY MEDICINE
Payer: COMMERCIAL

## 2018-05-31 PROCEDURE — 97530 THERAPEUTIC ACTIVITIES: CPT

## 2018-05-31 NOTE — PROGRESS NOTES
Dx: s/p lumbar fusion       Authorized # of Visits:  8        Next MD visit: none scheduled  Fall Risk: standard         Precautions: n/a            Discharge Summary    Pt has attended 8 visits in Physical Therapy.      Subjective: feeling good, sleeping w

## 2018-06-13 ENCOUNTER — TELEPHONE (OUTPATIENT)
Dept: FAMILY MEDICINE CLINIC | Facility: CLINIC | Age: 43
End: 2018-06-13

## 2018-06-13 DIAGNOSIS — M51.36 DEGENERATION OF LUMBAR INTERVERTEBRAL DISC: ICD-10-CM

## 2018-06-13 DIAGNOSIS — M54.16 LUMBAR RADICULOPATHY: ICD-10-CM

## 2018-06-13 DIAGNOSIS — M51.37 DDD (DEGENERATIVE DISC DISEASE), LUMBOSACRAL: Primary | ICD-10-CM

## 2018-06-13 DIAGNOSIS — M48.062 SPINAL STENOSIS OF LUMBAR REGION WITH NEUROGENIC CLAUDICATION: ICD-10-CM

## 2018-06-13 NOTE — TELEPHONE ENCOUNTER
Nestor Harris from Dr. Ramírez Amos's office called (ortho) - they have an appt to see [facility] tomorrow and the referral has . The referral only covered 1 day and he was seen in April. They are requesting a new referral for tomorrow's appt at 10 a.m.   This appt

## 2018-09-10 DIAGNOSIS — C92.10 CML (CHRONIC MYELOID LEUKEMIA) (HCC): ICD-10-CM

## 2018-10-10 ENCOUNTER — OFFICE VISIT (OUTPATIENT)
Dept: HEMATOLOGY/ONCOLOGY | Age: 43
End: 2018-10-10
Attending: FAMILY MEDICINE
Payer: COMMERCIAL

## 2018-10-10 VITALS
WEIGHT: 157.63 LBS | OXYGEN SATURATION: 99 % | TEMPERATURE: 97 F | SYSTOLIC BLOOD PRESSURE: 131 MMHG | RESPIRATION RATE: 18 BRPM | HEART RATE: 61 BPM | DIASTOLIC BLOOD PRESSURE: 86 MMHG | BODY MASS INDEX: 23 KG/M2

## 2018-10-10 DIAGNOSIS — C92.10 CML (CHRONIC MYELOID LEUKEMIA) (HCC): Primary | ICD-10-CM

## 2018-10-10 PROCEDURE — 99213 OFFICE O/P EST LOW 20 MIN: CPT | Performed by: SPECIALIST

## 2018-10-10 NOTE — PROGRESS NOTES
Patient is here today for follow up  with Lisa Schreiber for Chronic myeloid leukemia. Patient is on Nilotinib 300mg BID. Patient denies pain. Denies adverse side effects. Medication list and medical history were reviewed and updated.      Education Record

## 2018-10-13 NOTE — PROGRESS NOTES
[facility] Progress Note      Patient Name: Debbie Davalos   YOB: 1975  Medical Record Number: [id number]  Attending Physician: Chiquita Martin. Morales Blackwell M.D.      Date of Visit: 10/10/2018    Chief Complaint  Chronic myeloid leukemia - fo complaints. Past Medical History (historical data, reviewed)  CML (as above). Past Surgical History (historical data, reviewed)  Spine surgery. Family History (historical data, reviewed)  No known family history of hematologic malignancy.   Jose 3; motor and sensory grossly intact. Psychiatric Mood and affect appropriate; coherent speech; verbalizes understanding of our discussions today.     Laboratory   Recent Results (from the past 1008 hour(s))   COMP METABOLIC PANEL (14)    Collection Time: 1 Minor score. Patient is tolerating therapy well. Achieved a major molecular response at 6 months. Quantitative PCR for BCR/ABL pending. Continue therapy without modification unless results require otherwise.      Planned Follow Up   Patient will return fo

## 2018-11-01 DIAGNOSIS — C92.10 CML (CHRONIC MYELOID LEUKEMIA) (HCC): ICD-10-CM

## 2019-01-11 ENCOUNTER — OFFICE VISIT (OUTPATIENT)
Dept: FAMILY MEDICINE CLINIC | Facility: CLINIC | Age: 44
End: 2019-01-11

## 2019-01-11 VITALS
TEMPERATURE: 98 F | OXYGEN SATURATION: 99 % | HEART RATE: 90 BPM | BODY MASS INDEX: 23.62 KG/M2 | DIASTOLIC BLOOD PRESSURE: 78 MMHG | SYSTOLIC BLOOD PRESSURE: 124 MMHG | HEIGHT: 68.5 IN | WEIGHT: 157.63 LBS | RESPIRATION RATE: 16 BRPM

## 2019-01-11 DIAGNOSIS — J06.9 VIRAL URI WITH COUGH: ICD-10-CM

## 2019-01-11 DIAGNOSIS — J02.9 ACUTE VIRAL PHARYNGITIS: Primary | ICD-10-CM

## 2019-01-11 DIAGNOSIS — J02.9 SORE THROAT: ICD-10-CM

## 2019-01-11 LAB
CONTROL LINE PRESENT WITH A CLEAR BACKGROUND (YES/NO): YES YES/NO
STREP GRP A CUL-SCR: NEGATIVE

## 2019-01-11 PROCEDURE — 99213 OFFICE O/P EST LOW 20 MIN: CPT | Performed by: FAMILY MEDICINE

## 2019-01-11 PROCEDURE — 87880 STREP A ASSAY W/OPTIC: CPT | Performed by: FAMILY MEDICINE

## 2019-01-11 RX ORDER — ERGOCALCIFEROL (VITAMIN D2) 50 MCG
2000 CAPSULE ORAL DAILY
COMMUNITY
End: 2019-07-08

## 2019-01-11 NOTE — PROGRESS NOTES
CHIEF COMPLAINT: Patient presents with:  Sore Throat: x 3 days  Cough: x 2 days dry  Headache: Pt states he's taking OTC products for symptoms        HPI:     Jeyson Greenberg is a 37year old male presents for sore throat, cough, and HA.     Pt p/w a 2-3 Negative for shortness of breath and wheezing. Gastrointestinal: Negative for nausea, vomiting, abdominal pain and diarrhea. Musculoskeletal: Positive for myalgias. Pertinent positives and negatives noted in the the HPI.     PHYSICAL EXAM:   BP Phosphatase 10/10/2018 85    • Bilirubin, Total 10/10/2018 0.6    • Total Protein 10/10/2018 7.1    • Albumin 10/10/2018 4.0    • Globulin  10/10/2018 3.1    • A/G Ratio 10/10/2018 1.3    • BCR-ABL1, T(9;22) Source 10/10/2018 Not Specified    • BCR-ABL1 Ma on 10/10/2019      Patient/Caregiver Education: There are no barriers to learning. Medical education done. Outcome: Patient verbalizes understanding and agrees with plan. Advised to call or RTC if symptoms persist or worsen.     Problem List:   Patient Ac

## 2019-04-10 ENCOUNTER — OFFICE VISIT (OUTPATIENT)
Dept: HEMATOLOGY/ONCOLOGY | Age: 44
End: 2019-04-10
Attending: FAMILY MEDICINE
Payer: COMMERCIAL

## 2019-04-10 VITALS
DIASTOLIC BLOOD PRESSURE: 82 MMHG | TEMPERATURE: 97 F | HEART RATE: 73 BPM | RESPIRATION RATE: 18 BRPM | WEIGHT: 154.13 LBS | SYSTOLIC BLOOD PRESSURE: 137 MMHG | BODY MASS INDEX: 23 KG/M2 | OXYGEN SATURATION: 98 %

## 2019-04-10 DIAGNOSIS — C92.10 CML (CHRONIC MYELOID LEUKEMIA) (HCC): Primary | ICD-10-CM

## 2019-04-10 PROCEDURE — 99213 OFFICE O/P EST LOW 20 MIN: CPT | Performed by: SPECIALIST

## 2019-04-10 NOTE — PROGRESS NOTES
Patient is here today for follow up with Gladys Schmidt for Chronic Myelocytic Leukemia. Patient denies pain. Stated he is feeling good. Medication list and medical history were reviewed and updated.      Education Record    Learner:  Patient    Disease / Vicki Patton

## 2019-04-21 NOTE — PROGRESS NOTES
[facility] Progress Note      Patient Name: Melvin Abraham   YOB: 1975  Medical Record Number: [id number]  Attending Physician: Abigail Mancini. Stacy Mackenzie M.D.      Date of Visit: 4/10/2019    Chief Complaint  Chronic myeloid leukemia - fol complaints. Performance Status   Karnofsky 100% - Normal, no complaints. Past Medical History (historical data, reviewed)  CML (as above). Past Surgical History (historical data, reviewed)  Spine surgery.      Family History (historical data, revi edema.  Integumentary Skin is warm and dry. Neurologic Alert and oriented x 3; motor and sensory grossly intact. Psychiatric Mood and affect appropriate; coherent speech; verbalizes understanding of our discussions today.     Laboratory   Recent Results ( x10(3) uL    Eosinophil Absolute 0.18 0.00 - 0.70 x10(3) uL    Basophil Absolute 0.02 0.00 - 0.20 x10(3) uL    Immature Granulocyte Absolute 0.02 0.00 - 1.00 x10(3) uL    Neutrophil % 61.5 %    Lymphocyte % 25.2 %    Monocyte % 10.1 %    Eosinophil % 2.6 %

## 2019-07-08 ENCOUNTER — OFFICE VISIT (OUTPATIENT)
Dept: FAMILY MEDICINE CLINIC | Facility: CLINIC | Age: 44
End: 2019-07-08

## 2019-07-08 VITALS
BODY MASS INDEX: 23.67 KG/M2 | HEART RATE: 62 BPM | WEIGHT: 158 LBS | DIASTOLIC BLOOD PRESSURE: 74 MMHG | HEIGHT: 68.5 IN | SYSTOLIC BLOOD PRESSURE: 126 MMHG

## 2019-07-08 DIAGNOSIS — L25.9 CONTACT DERMATITIS, UNSPECIFIED CONTACT DERMATITIS TYPE, UNSPECIFIED TRIGGER: Primary | ICD-10-CM

## 2019-07-08 PROCEDURE — 99214 OFFICE O/P EST MOD 30 MIN: CPT | Performed by: FAMILY MEDICINE

## 2019-07-08 RX ORDER — PREDNISONE 20 MG/1
TABLET ORAL
Qty: 10 TABLET | Refills: 0 | Status: SHIPPED | OUTPATIENT
Start: 2019-07-08 | End: 2019-09-11

## 2019-07-08 NOTE — PROGRESS NOTES
CHIEF COMPLAINT: Patient presents with:  Rash Skin Problem (integumentary): was doing yard work and thinks he has poiszon ivy. HPI:     Renee Clark is a 37year old male presents for rash. Pt presents with a 1 week h/o rash.  He was doing y Respiratory: Negative for shortness of breath. Gastrointestinal: Negative for vomiting, abdominal pain and diarrhea. Musculoskeletal: Negative for myalgias. Skin: Positive for rash. Pertinent positives and negatives noted in the the HPI. 04/10/2019 3.3    • A/G Ratio 04/10/2019 1.2    • BCR-ABL1, T(9;22) Source 04/10/2019 Not Specified    • BCR-ABL1 Major (P210) Re* 04/10/2019 Not Detected    • BCR-ABL1/ABL1 Major(P210* 04/10/2019 0.61857    • BCR-ABL1 International S* 04/10/2019 0.0000 triamcinolone acetonide 0.1 % External Cream 1 Tube 0     Sig: Apply topically 2 (two) times daily.        Health Maintenance:  Pneumococcal PPSV23/PCV13 Highest Risk Adult(1 of 3 - PCV13) due on 08/08/1994  Annual Physical due on 09/07/2017  Influenza Vacc

## 2019-09-11 ENCOUNTER — OFFICE VISIT (OUTPATIENT)
Dept: FAMILY MEDICINE CLINIC | Facility: CLINIC | Age: 44
End: 2019-09-11

## 2019-09-11 ENCOUNTER — APPOINTMENT (OUTPATIENT)
Dept: LAB | Age: 44
End: 2019-09-11
Attending: FAMILY MEDICINE
Payer: COMMERCIAL

## 2019-09-11 VITALS
BODY MASS INDEX: 23.49 KG/M2 | DIASTOLIC BLOOD PRESSURE: 80 MMHG | WEIGHT: 156.81 LBS | HEART RATE: 55 BPM | TEMPERATURE: 98 F | HEIGHT: 68.5 IN | SYSTOLIC BLOOD PRESSURE: 125 MMHG

## 2019-09-11 DIAGNOSIS — Z13.6 ISCHEMIC HEART DISEASE SCREEN: ICD-10-CM

## 2019-09-11 DIAGNOSIS — Z00.00 ROUTINE ADULT HEALTH MAINTENANCE: Primary | ICD-10-CM

## 2019-09-11 DIAGNOSIS — Z30.2 REQUEST FOR STERILIZATION: ICD-10-CM

## 2019-09-11 DIAGNOSIS — Z00.00 ROUTINE ADULT HEALTH MAINTENANCE: ICD-10-CM

## 2019-09-11 LAB
ALBUMIN SERPL-MCNC: 4.3 G/DL (ref 3.4–5)
ALBUMIN/GLOB SERPL: 1.4 {RATIO} (ref 1–2)
ALP LIVER SERPL-CCNC: 72 U/L (ref 45–117)
ALT SERPL-CCNC: 32 U/L (ref 16–61)
ANION GAP SERPL CALC-SCNC: 6 MMOL/L (ref 0–18)
AST SERPL-CCNC: 22 U/L (ref 15–37)
BILIRUB SERPL-MCNC: 1.1 MG/DL (ref 0.1–2)
BUN BLD-MCNC: 16 MG/DL (ref 7–18)
BUN/CREAT SERPL: 17.6 (ref 10–20)
CALCIUM BLD-MCNC: 8.9 MG/DL (ref 8.5–10.1)
CHLORIDE SERPL-SCNC: 106 MMOL/L (ref 98–112)
CHOLEST SMN-MCNC: 155 MG/DL (ref ?–200)
CO2 SERPL-SCNC: 26 MMOL/L (ref 21–32)
CREAT BLD-MCNC: 0.91 MG/DL (ref 0.7–1.3)
DEPRECATED RDW RBC AUTO: 39.1 FL (ref 35.1–46.3)
ERYTHROCYTE [DISTWIDTH] IN BLOOD BY AUTOMATED COUNT: 11.8 % (ref 11–15)
GLOBULIN PLAS-MCNC: 3.1 G/DL (ref 2.8–4.4)
GLUCOSE BLD-MCNC: 81 MG/DL (ref 70–99)
HCT VFR BLD AUTO: 45.9 % (ref 39–53)
HDLC SERPL-MCNC: 66 MG/DL (ref 40–59)
HGB BLD-MCNC: 15.8 G/DL (ref 13–17.5)
LDLC SERPL CALC-MCNC: 80 MG/DL (ref ?–100)
M PROTEIN MFR SERPL ELPH: 7.4 G/DL (ref 6.4–8.2)
MCH RBC QN AUTO: 31.1 PG (ref 26–34)
MCHC RBC AUTO-ENTMCNC: 34.4 G/DL (ref 31–37)
MCV RBC AUTO: 90.4 FL (ref 80–100)
NONHDLC SERPL-MCNC: 89 MG/DL (ref ?–130)
OSMOLALITY SERPL CALC.SUM OF ELEC: 286 MOSM/KG (ref 275–295)
PLATELET # BLD AUTO: 241 10(3)UL (ref 150–450)
POTASSIUM SERPL-SCNC: 3.9 MMOL/L (ref 3.5–5.1)
PSA SERPL-MCNC: 0.45 NG/ML (ref ?–4)
RBC # BLD AUTO: 5.08 X10(6)UL (ref 4.3–5.7)
SODIUM SERPL-SCNC: 138 MMOL/L (ref 136–145)
T4 FREE SERPL-MCNC: 1 NG/DL (ref 0.8–1.7)
TRIGL SERPL-MCNC: 46 MG/DL (ref 30–149)
TSI SER-ACNC: 1.16 MIU/ML (ref 0.36–3.74)
VLDLC SERPL CALC-MCNC: 9 MG/DL (ref 0–30)
WBC # BLD AUTO: 5.6 X10(3) UL (ref 4–11)

## 2019-09-11 PROCEDURE — 80061 LIPID PANEL: CPT

## 2019-09-11 PROCEDURE — 36415 COLL VENOUS BLD VENIPUNCTURE: CPT

## 2019-09-11 PROCEDURE — 84443 ASSAY THYROID STIM HORMONE: CPT

## 2019-09-11 PROCEDURE — 84153 ASSAY OF PSA TOTAL: CPT

## 2019-09-11 PROCEDURE — 84439 ASSAY OF FREE THYROXINE: CPT

## 2019-09-11 PROCEDURE — 99396 PREV VISIT EST AGE 40-64: CPT | Performed by: FAMILY MEDICINE

## 2019-09-11 PROCEDURE — 85027 COMPLETE CBC AUTOMATED: CPT

## 2019-09-11 PROCEDURE — 80053 COMPREHEN METABOLIC PANEL: CPT

## 2019-09-11 NOTE — PROGRESS NOTES
Trav Brown is a 40year old male who presents for a complete physical exam.   HPI:   Pt complains of nothing.   Urination changes no  ED symptoms no  Immunizations needed no  Wt Readings from Last 6 Encounters:  09/11/19 : 156 lb 12.8 oz  07/08/19 : 1. 78 1.00 - 4.00 x10(3) uL    Monocyte Absolute 0.71 0.10 - 1.00 x10(3) uL    Eosinophil Absolute 0.18 0.00 - 0.70 x10(3) uL    Basophil Absolute 0.02 0.00 - 0.20 x10(3) uL    Immature Granulocyte Absolute 0.02 0.00 - 1.00 x10(3) uL    Neutrophil % 61.5 % glands/polyuria/polydypsia  ALL/ASTHMA: denies allergic rhinitis/asthma    EXAM:   /80 (BP Location: Right arm, Patient Position: Sitting)   Pulse 55   Temp 98.4 °F (36.9 °C) (Temporal)   Ht 68.5\"   Wt 156 lb 12.8 oz   BMI 23.49 kg/m²   Body mass in 45-60 minutes 6-7 days a week for weight loss. Advised testicular self exam once a month. Above age 28-36, patient was told to have a heart scan done for coronary artery disease screening every 3-5 years.  Information given from Igor William so patient can lilian

## 2019-10-08 NOTE — PROGRESS NOTES
[facility] Progress Note      Patient Name: Connie Lin   YOB: 1975  Medical Record Number: [id number]  Attending Physician: Prabhu Vines. Jani Odonnell M.D.      Date of Visit: 10/9/2019    Chief Complaint  Chronic myeloid leukemia - fol complaints. Performance Status   Karnofsky 100% - Normal, no complaints. Past Medical History (historical data, reviewed by physician)  CML (as above). Past Surgical History (historical data, reviewed by physician)  Spine surgery.      Family Hist hepatosplenomegaly. Extremities No lower extremity edema. Integumentary Skin is warm and dry. Neurologic Alert and oriented x 3; motor and sensory grossly intact.   Psychiatric Mood and affect appropriate; coherent speech; verbalizes understanding of our Collection Time: 09/11/19 11:32 AM   Result Value Ref Range    PSA 0.45 <=4.00 ng/mL     Impression and Plan   1. CML: Patient has chronic phase disease with a low Minor score at diagnosis. Patient remains off therapy.  Quantitative PCR for BCR/ABL is pen

## 2019-10-09 ENCOUNTER — OFFICE VISIT (OUTPATIENT)
Dept: HEMATOLOGY/ONCOLOGY | Age: 44
End: 2019-10-09
Attending: FAMILY MEDICINE
Payer: COMMERCIAL

## 2019-10-09 VITALS
SYSTOLIC BLOOD PRESSURE: 127 MMHG | RESPIRATION RATE: 18 BRPM | BODY MASS INDEX: 24 KG/M2 | TEMPERATURE: 98 F | WEIGHT: 155 LBS | OXYGEN SATURATION: 99 % | HEART RATE: 64 BPM | DIASTOLIC BLOOD PRESSURE: 76 MMHG

## 2019-10-09 DIAGNOSIS — C92.10 CML (CHRONIC MYELOID LEUKEMIA) (HCC): Primary | ICD-10-CM

## 2019-10-09 PROCEDURE — 99213 OFFICE O/P EST LOW 20 MIN: CPT | Performed by: SPECIALIST

## 2019-10-09 NOTE — PROGRESS NOTES
Patient is here today for follow up with Kayla Rivero for Chronic Myeloid Leukemia. Patient denies pain. Denies fatigue. Stated is feeling good. Medication list and medical history were reviewed and updated.      Education Record    Learner:  Patient    Dise

## 2019-10-12 ENCOUNTER — OFFICE VISIT (OUTPATIENT)
Dept: FAMILY MEDICINE CLINIC | Facility: CLINIC | Age: 44
End: 2019-10-12

## 2019-10-12 VITALS
DIASTOLIC BLOOD PRESSURE: 65 MMHG | SYSTOLIC BLOOD PRESSURE: 128 MMHG | WEIGHT: 157 LBS | OXYGEN SATURATION: 98 % | BODY MASS INDEX: 24 KG/M2 | HEART RATE: 67 BPM | RESPIRATION RATE: 12 BRPM | TEMPERATURE: 98 F

## 2019-10-12 DIAGNOSIS — R05.9 COUGH: Primary | ICD-10-CM

## 2019-10-12 PROCEDURE — 99213 OFFICE O/P EST LOW 20 MIN: CPT | Performed by: PHYSICIAN ASSISTANT

## 2019-10-12 RX ORDER — BENZONATATE 200 MG/1
200 CAPSULE ORAL 3 TIMES DAILY PRN
Qty: 30 CAPSULE | Refills: 0 | Status: SHIPPED | OUTPATIENT
Start: 2019-10-12 | End: 2019-10-22

## 2019-10-12 NOTE — PROGRESS NOTES
CHIEF COMPLAINT:   Patient presents with:  Cough        HPI:   Emily Garcia is a 40year old male who presents for cough for one week. Worse at night. Worse with laughing. +wheeze. No asthma hx. No chest pain or SOB. No fever.  No nasal congestion, so Meds & Refills for this Visit:  Requested Prescriptions     Signed Prescriptions Disp Refills   • benzonatate 200 MG Oral Cap 30 capsule 0     Sig: Take 1 capsule (200 mg total) by mouth 3 (three) times daily as needed for cough.      Side effects, risk

## 2019-10-12 NOTE — PATIENT INSTRUCTIONS
Claritin OTC   Flonase OTC   Rest   Fluids   Please follow up with PCP if no improvement or if symptoms worsen

## 2019-10-17 PROBLEM — Z30.2 STERILIZATION: Status: ACTIVE | Noted: 2019-10-17

## 2019-11-15 ENCOUNTER — OFFICE VISIT (OUTPATIENT)
Dept: FAMILY MEDICINE CLINIC | Facility: CLINIC | Age: 44
End: 2019-11-15

## 2019-11-15 VITALS
HEIGHT: 68 IN | WEIGHT: 155.38 LBS | TEMPERATURE: 97 F | DIASTOLIC BLOOD PRESSURE: 80 MMHG | HEART RATE: 83 BPM | SYSTOLIC BLOOD PRESSURE: 116 MMHG | RESPIRATION RATE: 17 BRPM | OXYGEN SATURATION: 99 % | BODY MASS INDEX: 23.55 KG/M2

## 2019-11-15 DIAGNOSIS — H66.92 ACUTE OTITIS MEDIA, LEFT: Primary | ICD-10-CM

## 2019-11-15 PROCEDURE — 99214 OFFICE O/P EST MOD 30 MIN: CPT | Performed by: FAMILY MEDICINE

## 2019-11-15 RX ORDER — AMOXICILLIN 500 MG/1
1000 TABLET, FILM COATED ORAL EVERY 8 HOURS
Qty: 42 TABLET | Refills: 0 | Status: SHIPPED | OUTPATIENT
Start: 2019-11-15 | End: 2019-11-22

## 2019-11-15 NOTE — PROGRESS NOTES
CHIEF COMPLAINT: Patient presents with:  Cough: dry  Ear Pain: left ear x 4 days        HPI:     Trav Brown is a 40year old male presents for eaer pain. Breana Morrow is a 41 yo M p/w left ear pain x 4 days.  He was seen in the [facility] for cough and cold Musculoskeletal: Negative for myalgias. Neurological: Negative for dizziness and headaches. Pertinent positives and negatives noted in the the HPI.     PHYSICAL EXAM:   /80 (BP Location: Right arm, Patient Position: Sitting, Cuff Size: adul with PCP. - amoxicillin 500 MG Oral Tab; Take 2 tablets (1,000 mg total) by mouth every 8 (eight) hours for 7 days. Dispense: 42 tablet;  Refill: 0      Meds This Visit:  Requested Prescriptions     Signed Prescriptions Disp Refills   • amoxicillin 500

## 2020-01-08 ENCOUNTER — OFFICE VISIT (OUTPATIENT)
Dept: FAMILY MEDICINE CLINIC | Facility: CLINIC | Age: 45
End: 2020-01-08

## 2020-01-08 VITALS
BODY MASS INDEX: 22.91 KG/M2 | WEIGHT: 151.19 LBS | HEART RATE: 74 BPM | RESPIRATION RATE: 16 BRPM | HEIGHT: 68 IN | TEMPERATURE: 98 F | SYSTOLIC BLOOD PRESSURE: 121 MMHG | DIASTOLIC BLOOD PRESSURE: 70 MMHG

## 2020-01-08 DIAGNOSIS — L25.9 CONTACT DERMATITIS, UNSPECIFIED CONTACT DERMATITIS TYPE, UNSPECIFIED TRIGGER: Primary | ICD-10-CM

## 2020-01-08 PROCEDURE — 99213 OFFICE O/P EST LOW 20 MIN: CPT | Performed by: FAMILY MEDICINE

## 2020-01-08 RX ORDER — CLOBETASOL PROPIONATE 0.5 MG/G
CREAM TOPICAL
Qty: 30 G | Refills: 0 | Status: SHIPPED | OUTPATIENT
Start: 2020-01-08 | End: 2020-01-27

## 2020-01-08 RX ORDER — PREDNISONE 20 MG/1
20 TABLET ORAL 2 TIMES DAILY
Qty: 10 TABLET | Refills: 0 | Status: SHIPPED | OUTPATIENT
Start: 2020-01-08 | End: 2020-01-13

## 2020-01-08 NOTE — PROGRESS NOTES
HPI:   Rylee Ramos is a 40year old male that presents for Patient presents with:  Rash: Has recently moved to a new house was doing yard work and noticed a rash on his right arm. Was seen at 18 MD and prescribed steroids.         Patient states th developed, well nourished, no apparent distress.   HEENT:     Head:  Normocephalic, atraumatic    Eyes: EOMI, PERRLA, no scleral icterus, conjunctivae clear, no eye discharge    Ears: External normal. TMs normal without erythema or effusion   Nose: patent,

## 2020-01-27 ENCOUNTER — OFFICE VISIT (OUTPATIENT)
Dept: FAMILY MEDICINE CLINIC | Facility: CLINIC | Age: 45
End: 2020-01-27
Payer: COMMERCIAL

## 2020-01-27 VITALS
BODY MASS INDEX: 22.61 KG/M2 | TEMPERATURE: 98 F | SYSTOLIC BLOOD PRESSURE: 122 MMHG | WEIGHT: 149.19 LBS | DIASTOLIC BLOOD PRESSURE: 80 MMHG | HEART RATE: 69 BPM | HEIGHT: 68 IN | RESPIRATION RATE: 16 BRPM

## 2020-01-27 DIAGNOSIS — S39.012A STRAIN OF LUMBAR REGION, INITIAL ENCOUNTER: ICD-10-CM

## 2020-01-27 DIAGNOSIS — S16.1XXA STRAIN OF NECK MUSCLE, INITIAL ENCOUNTER: Primary | ICD-10-CM

## 2020-01-27 PROCEDURE — 99214 OFFICE O/P EST MOD 30 MIN: CPT | Performed by: FAMILY MEDICINE
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
## 2020-04-08 ENCOUNTER — APPOINTMENT (OUTPATIENT)
Dept: HEMATOLOGY/ONCOLOGY | Age: 45
End: 2020-04-08
Attending: FAMILY MEDICINE
Payer: COMMERCIAL

## 2020-06-11 ENCOUNTER — APPOINTMENT (OUTPATIENT)
Dept: HEMATOLOGY/ONCOLOGY | Facility: HOSPITAL | Age: 45
End: 2020-06-11
Payer: COMMERCIAL

## 2021-01-06 NOTE — PROGRESS NOTES
[facility] Progress Note      Patient Name: Tegan York   YOB: 1975  Medical Record Number: [id number]  Attending Physician: Susan Merino M.D.      Date of Visit: 1/8/2021    Chief Complaint  Chronic myeloid leukemia - foll denies any fevers, night sweats, weight loss. He denies any abdominal pain. Performance Status   Karnofsky 100% - Normal, no complaints. Past Medical History (historical data, reviewed by physician)  CML (as above).     Past Surgical History (histori Collection Time: 01/08/21 10:16 AM   Result Value Ref Range    WBC 4.9 4.0 - 11.0 x10(3) uL    RBC 5.04 4.30 - 5.70 x10(6)uL    HGB 15.7 13.0 - 17.5 g/dL    HCT 44.9 39.0 - 53.0 %    .0 150.0 - 450.0 10(3)uL    MCV 89.1 80.0 - 100.0 fL    MCH 31.2 2

## 2021-01-08 ENCOUNTER — OFFICE VISIT (OUTPATIENT)
Dept: HEMATOLOGY/ONCOLOGY | Facility: HOSPITAL | Age: 46
End: 2021-01-08
Attending: SPECIALIST
Payer: COMMERCIAL

## 2021-01-08 VITALS
DIASTOLIC BLOOD PRESSURE: 90 MMHG | HEART RATE: 65 BPM | WEIGHT: 155 LBS | OXYGEN SATURATION: 100 % | RESPIRATION RATE: 18 BRPM | TEMPERATURE: 97 F | SYSTOLIC BLOOD PRESSURE: 167 MMHG | BODY MASS INDEX: 24 KG/M2

## 2021-01-08 DIAGNOSIS — C92.10 CML (CHRONIC MYELOID LEUKEMIA) (HCC): Primary | ICD-10-CM

## 2021-01-08 LAB
ALBUMIN SERPL-MCNC: 4.5 G/DL (ref 3.4–5)
ALBUMIN/GLOB SERPL: 1.5 {RATIO} (ref 1–2)
ALP LIVER SERPL-CCNC: 69 U/L
ALT SERPL-CCNC: 37 U/L
ANION GAP SERPL CALC-SCNC: 3 MMOL/L (ref 0–18)
AST SERPL-CCNC: 31 U/L (ref 15–37)
BASOPHILS # BLD AUTO: 0.04 X10(3) UL (ref 0–0.2)
BASOPHILS NFR BLD AUTO: 0.8 %
BILIRUB SERPL-MCNC: 0.9 MG/DL (ref 0.1–2)
BUN BLD-MCNC: 15 MG/DL (ref 7–18)
BUN/CREAT SERPL: 13.9 (ref 10–20)
CALCIUM BLD-MCNC: 9 MG/DL (ref 8.5–10.1)
CHLORIDE SERPL-SCNC: 105 MMOL/L (ref 98–112)
CO2 SERPL-SCNC: 29 MMOL/L (ref 21–32)
CREAT BLD-MCNC: 1.08 MG/DL
DEPRECATED RDW RBC AUTO: 39.1 FL (ref 35.1–46.3)
EOSINOPHIL # BLD AUTO: 0.09 X10(3) UL (ref 0–0.7)
EOSINOPHIL NFR BLD AUTO: 1.8 %
ERYTHROCYTE [DISTWIDTH] IN BLOOD BY AUTOMATED COUNT: 12.1 % (ref 11–15)
GLOBULIN PLAS-MCNC: 3.1 G/DL (ref 2.8–4.4)
GLUCOSE BLD-MCNC: 92 MG/DL (ref 70–99)
HCT VFR BLD AUTO: 44.9 %
HGB BLD-MCNC: 15.7 G/DL
IMM GRANULOCYTES # BLD AUTO: 0.02 X10(3) UL (ref 0–1)
IMM GRANULOCYTES NFR BLD: 0.4 %
LYMPHOCYTES # BLD AUTO: 1.26 X10(3) UL (ref 1–4)
LYMPHOCYTES NFR BLD AUTO: 25.6 %
M PROTEIN MFR SERPL ELPH: 7.6 G/DL (ref 6.4–8.2)
MCH RBC QN AUTO: 31.2 PG (ref 26–34)
MCHC RBC AUTO-ENTMCNC: 35 G/DL (ref 31–37)
MCV RBC AUTO: 89.1 FL
MONOCYTES # BLD AUTO: 0.47 X10(3) UL (ref 0.1–1)
MONOCYTES NFR BLD AUTO: 9.5 %
NEUTROPHILS # BLD AUTO: 3.05 X10 (3) UL (ref 1.5–7.7)
NEUTROPHILS # BLD AUTO: 3.05 X10(3) UL (ref 1.5–7.7)
NEUTROPHILS NFR BLD AUTO: 61.9 %
OSMOLALITY SERPL CALC.SUM OF ELEC: 284 MOSM/KG (ref 275–295)
PATIENT FASTING Y/N/NP: NO
PLATELET # BLD AUTO: 209 10(3)UL (ref 150–450)
POTASSIUM SERPL-SCNC: 4.5 MMOL/L (ref 3.5–5.1)
RBC # BLD AUTO: 5.04 X10(6)UL
SODIUM SERPL-SCNC: 137 MMOL/L (ref 136–145)
WBC # BLD AUTO: 4.9 X10(3) UL (ref 4–11)

## 2021-01-08 PROCEDURE — 99213 OFFICE O/P EST LOW 20 MIN: CPT | Performed by: SPECIALIST

## 2021-01-08 NOTE — PROGRESS NOTES
Patient is here today for follow up with Jeffrey Graham for Chronic Myeloid Leukemia. Patient denies pain. Stated he is feeling good. Medication list and medical history were reviewed and updated.      Education Record    Learner:  Patient    Disease / Sonja Nageotte

## 2021-01-13 LAB
BCR-ABL1, MAJOR (P210) RESULT: NOT DETECTED
BCRABL1 INTERNATIONAL SCALE(%): 0 %

## 2021-04-07 PROCEDURE — 88342 IMHCHEM/IMCYTCHM 1ST ANTB: CPT | Performed by: DERMATOLOGY

## 2021-04-07 PROCEDURE — 88305 TISSUE EXAM BY PATHOLOGIST: CPT | Performed by: DERMATOLOGY

## 2021-04-07 PROCEDURE — 88341 IMHCHEM/IMCYTCHM EA ADD ANTB: CPT | Performed by: DERMATOLOGY

## 2021-04-08 ENCOUNTER — LAB REQUISITION (OUTPATIENT)
Dept: LAB | Facility: HOSPITAL | Age: 46
End: 2021-04-08
Payer: COMMERCIAL

## 2021-04-08 DIAGNOSIS — D48.5 NEOPLASM OF UNCERTAIN BEHAVIOR OF SKIN: ICD-10-CM

## 2021-04-12 ENCOUNTER — TELEPHONE (OUTPATIENT)
Dept: FAMILY MEDICINE CLINIC | Facility: CLINIC | Age: 46
End: 2021-04-12

## 2021-04-12 DIAGNOSIS — C43.59 MELANOMA OF BACK (HCC): Primary | ICD-10-CM

## 2021-04-13 ENCOUNTER — TELEPHONE (OUTPATIENT)
Dept: FAMILY MEDICINE CLINIC | Facility: CLINIC | Age: 46
End: 2021-04-13

## 2021-04-13 NOTE — TELEPHONE ENCOUNTER
Oncology doctor's office calling re: referral, patient has an appt tomorrow @noon, the referral is open, doesn't look like it is authorized. They would still be able to see the patient if it's \"pending\". Please advise.

## 2021-04-14 ENCOUNTER — OFFICE VISIT (OUTPATIENT)
Dept: SURGERY | Facility: CLINIC | Age: 46
End: 2021-04-14

## 2021-04-14 VITALS
BODY MASS INDEX: 23.09 KG/M2 | HEIGHT: 68 IN | DIASTOLIC BLOOD PRESSURE: 94 MMHG | WEIGHT: 152.38 LBS | OXYGEN SATURATION: 100 % | RESPIRATION RATE: 16 BRPM | HEART RATE: 60 BPM | SYSTOLIC BLOOD PRESSURE: 157 MMHG

## 2021-04-14 DIAGNOSIS — C43.59 MALIGNANT MELANOMA OF TORSO EXCLUDING BREAST (HCC): Primary | ICD-10-CM

## 2021-04-14 PROCEDURE — 3080F DIAST BP >= 90 MM HG: CPT | Performed by: SURGERY

## 2021-04-14 PROCEDURE — 3008F BODY MASS INDEX DOCD: CPT | Performed by: SURGERY

## 2021-04-14 PROCEDURE — 3077F SYST BP >= 140 MM HG: CPT | Performed by: SURGERY

## 2021-04-14 PROCEDURE — 99245 OFF/OP CONSLTJ NEW/EST HI 55: CPT | Performed by: SURGERY

## 2021-04-14 RX ORDER — MAGNESIUM OXIDE 400 MG (241.3 MG MAGNESIUM) TABLET
100 TABLET DAILY
COMMUNITY

## 2021-04-14 NOTE — TELEPHONE ENCOUNTER
Doctor's office states they are waiting for our RN's to contact [facility] to approve the referral, patient is scheduled today, please advise.

## 2021-04-14 NOTE — PATIENT INSTRUCTIONS
Surgery: Wide local excision melanoma L upper back with sentinel lymph node biopsy     Date of Surgery:[facility]    Hospital:    [address] [facility]   Phone: [phone]    · This is an outpatient procedure.   · Use th Clearance x [facility]     Dr. Arlene Riggins nurse direct line:  [phone]  Monday through Friday 8:30 am to 4:30 pm    For Dr. Arlene Riggins office: [phone]/ Fax: [phone]  After hours you will reach the answering service     [place]

## 2021-04-14 NOTE — H&P (VIEW-ONLY)
[facility]    Patient Name:  Carmel Epps   YOB: 1975   Gender:  Male   Appt Date:  4/14/2021   Provider:  Jose A Martines MD   Insurance:  Rickey Barrera  Referring Provider:  BMI 23.17 kg/m²      Medications Reviewed:    Current Outpatient Medications:   •  Vitamin B12 100 MCG Oral Tab, Take 100 mcg by mouth daily. , Disp: , Rfl:   •  ZINC OR, Use as directed in the mouth or throat., Disp: , Rfl:   •  CALCIUM OR, Take by mouth. hematuria. Musculoskeletal: Negative for myalgias. Skin: Negative for rash and wound. +Change in mole   Neurological: Negative for dizziness and headaches. Hematological: Negative for adenopathy.    Psychiatric/Behavioral: Negative for behavior cell reaction and prominent melanoderma are seen in association with tumor cell infiltrates. · Tumor cell infiltrates demonstrate focal extension to the inked deep specimen margin.   · Tumor cell infiltrates demonstrate a closest approach of 1.0 mm from th patient in detail including bleeding, infection, delayed wound healing, lymphedema, damage to surrounding structures, cardiopulmonary events. Patient understands and wishes to proceed. We will find a date.      Hiram Santos MD  [facility] Surgical On

## 2021-04-14 NOTE — CONSULTS
[facility]    Patient Name:  Veronica Ross   YOB: 1975   Gender:  Male   Appt Date:  4/14/2021   Provider:  Rahul Lerma MD   Insurance:  Alpa Solis  Referring Provider:  BMI 23.17 kg/m²      Medications Reviewed:    Current Outpatient Medications:   •  Vitamin B12 100 MCG Oral Tab, Take 100 mcg by mouth daily. , Disp: , Rfl:   •  ZINC OR, Use as directed in the mouth or throat., Disp: , Rfl:   •  CALCIUM OR, Take by mouth. hematuria. Musculoskeletal: Negative for myalgias. Skin: Negative for rash and wound. +Change in mole   Neurological: Negative for dizziness and headaches. Hematological: Negative for adenopathy.    Psychiatric/Behavioral: Negative for behavior cell reaction and prominent melanoderma are seen in association with tumor cell infiltrates. · Tumor cell infiltrates demonstrate focal extension to the inked deep specimen margin.   · Tumor cell infiltrates demonstrate a closest approach of 1.0 mm from th patient in detail including bleeding, infection, delayed wound healing, lymphedema, damage to surrounding structures, cardiopulmonary events. Patient understands and wishes to proceed. We will find a date.      Samuel Hylton MD  Complex [facility] Surgical On

## 2021-04-14 NOTE — TELEPHONE ENCOUNTER
Referral authorized, Dr. Srinath Ignacio office was notified.  Pt has appt with Dr. Justice Hurt today at 12pm.

## 2021-04-14 NOTE — TELEPHONE ENCOUNTER
Spoke to Dr. Lowry [place] office, informed referral is marked as pending. Advised that referral would need to be authorized prior to pt's appointment. Provided referral dept phone # is office for them to contact. Verbalized understanding and agreement.  All q

## 2021-04-19 ENCOUNTER — LAB ENCOUNTER (OUTPATIENT)
Dept: LAB | Age: 46
End: 2021-04-19
Attending: SURGERY
Payer: COMMERCIAL

## 2021-04-19 DIAGNOSIS — Z01.818 PREOPERATIVE TESTING: ICD-10-CM

## 2021-04-21 ENCOUNTER — TELEPHONE (OUTPATIENT)
Dept: FAMILY MEDICINE CLINIC | Facility: CLINIC | Age: 46
End: 2021-04-21

## 2021-04-21 ENCOUNTER — OFFICE VISIT (OUTPATIENT)
Dept: FAMILY MEDICINE CLINIC | Facility: CLINIC | Age: 46
End: 2021-04-21

## 2021-04-21 VITALS
SYSTOLIC BLOOD PRESSURE: 150 MMHG | WEIGHT: 154 LBS | DIASTOLIC BLOOD PRESSURE: 92 MMHG | BODY MASS INDEX: 23.34 KG/M2 | HEIGHT: 68 IN | HEART RATE: 68 BPM | RESPIRATION RATE: 14 BRPM | OXYGEN SATURATION: 99 % | TEMPERATURE: 97 F

## 2021-04-21 DIAGNOSIS — Z01.818 PREOPERATIVE CLEARANCE: Primary | ICD-10-CM

## 2021-04-21 DIAGNOSIS — C43.59 MELANOMA OF BACK (HCC): ICD-10-CM

## 2021-04-21 PROCEDURE — 3077F SYST BP >= 140 MM HG: CPT | Performed by: FAMILY MEDICINE

## 2021-04-21 PROCEDURE — 3080F DIAST BP >= 90 MM HG: CPT | Performed by: FAMILY MEDICINE

## 2021-04-21 PROCEDURE — 99214 OFFICE O/P EST MOD 30 MIN: CPT | Performed by: FAMILY MEDICINE

## 2021-04-21 PROCEDURE — 3008F BODY MASS INDEX DOCD: CPT | Performed by: FAMILY MEDICINE

## 2021-04-21 NOTE — H&P
Preoperative History and Physical Family Medicine    CC: Patient presents with:  Pre-Op Exam: excision melanoma L upper back/ Dr Cormier/ 4/22/21      Nadiasujey Schultz is 39year old presenting for a preoperative exam.    This patient is having surgery Vitamin B12 100 MCG Oral Tab, Take 100 mcg by mouth daily. , Disp: , Rfl:   •  ZINC OR, Use as directed in the mouth or throat., Disp: , Rfl:   •  CALCIUM OR, Take by mouth., Disp: , Rfl:     Review of Systems   Review of Systems   Constitutional: Negative Neurological:      General: No focal deficit present. Mental Status: He is alert.    Psychiatric:         Mood and Affect: Mood normal.         [facility] Lab Encounter on 04/19/2021   Component Date Value   • SARS-CoV-2 (Alinity) 04/19/2021 Not Detected identified                                Anatomic Amber Sin) Level:    IV (Melanoma invades reticular dermis)                                Mitotic Rate:     < 1 mitoses / mm2                               Microsatellite(s):    Not identified contact my office if questions or concerns. Patient was advised to hold any blood thinners, anti-inflammatories, or supplements that patient may be taking at least 7-10 days prior to procedure. I will forward my notes to  for review.   Patient/C

## 2021-04-21 NOTE — PATIENT INSTRUCTIONS
Thank you for choosing Ora Mendoza MD at [place] [address]  To Do: Gabriel Waldrop  1. Considerations in the Preoperative period:   Surgery is a big deal.  Anesthesia and your medicines and medical issues all stress out your body.   Therefore read agents:  Serotinin reuptake inhibitors like zoloft, effexor, paxil, prozac, citalopram, remeron etc. For mood should be held 3 weeks before surgery if high risk of bleeding as these may increase risk of bleeding  Most other psychiatric meds like antipsycho if you have not done so.  All your results will post on there.  [email or web address]  • You can NOW use [employer] to book your appointments with us, or consider using open access scheduling which is through the [facility] website [email or web address] [employer] [employer] approved your testing, please call Central Scheduling at [phone]  Please allow our office 5 business days to contact you regarding any testing results.     Refill policies:   Allow 3 business days for refills; controlled substances may take longer and

## 2021-04-22 ENCOUNTER — HOSPITAL ENCOUNTER (OUTPATIENT)
Dept: NUCLEAR MEDICINE | Facility: HOSPITAL | Age: 46
Discharge: HOME | End: 2021-04-22
Attending: SURGERY
Payer: COMMERCIAL

## 2021-04-22 ENCOUNTER — ANESTHESIA EVENT (OUTPATIENT)
Dept: SURGERY | Facility: HOSPITAL | Age: 46
End: 2021-04-22
Payer: COMMERCIAL

## 2021-04-22 ENCOUNTER — ANESTHESIA (OUTPATIENT)
Dept: SURGERY | Facility: HOSPITAL | Age: 46
End: 2021-04-22
Payer: COMMERCIAL

## 2021-04-22 ENCOUNTER — HOSPITAL ENCOUNTER (OUTPATIENT)
Facility: HOSPITAL | Age: 46
Setting detail: HOSPITAL OUTPATIENT SURGERY
Discharge: HOME | End: 2021-04-22
Attending: SURGERY | Admitting: SURGERY
Payer: COMMERCIAL

## 2021-04-22 VITALS
DIASTOLIC BLOOD PRESSURE: 76 MMHG | HEIGHT: 69 IN | HEART RATE: 88 BPM | OXYGEN SATURATION: 100 % | WEIGHT: 150 LBS | BODY MASS INDEX: 22.22 KG/M2 | RESPIRATION RATE: 18 BRPM | TEMPERATURE: 97 F | SYSTOLIC BLOOD PRESSURE: 134 MMHG

## 2021-04-22 DIAGNOSIS — C43.59 MALIGNANT MELANOMA OF TORSO EXCLUDING BREAST (HCC): ICD-10-CM

## 2021-04-22 DIAGNOSIS — Z01.818 PREOPERATIVE TESTING: Primary | ICD-10-CM

## 2021-04-22 PROCEDURE — 88307 TISSUE EXAM BY PATHOLOGIST: CPT | Performed by: SURGERY

## 2021-04-22 PROCEDURE — 0KBG0ZZ EXCISION OF LEFT TRUNK MUSCLE, OPEN APPROACH: ICD-10-PCS | Performed by: SURGERY

## 2021-04-22 PROCEDURE — 07B60ZX EXCISION OF LEFT AXILLARY LYMPHATIC, OPEN APPROACH, DIAGNOSTIC: ICD-10-PCS | Performed by: SURGERY

## 2021-04-22 PROCEDURE — 0JB70ZZ EXCISION OF BACK SUBCUTANEOUS TISSUE AND FASCIA, OPEN APPROACH: ICD-10-PCS | Performed by: SURGERY

## 2021-04-22 PROCEDURE — 88341 IMHCHEM/IMCYTCHM EA ADD ANTB: CPT | Performed by: SURGERY

## 2021-04-22 PROCEDURE — 88342 IMHCHEM/IMCYTCHM 1ST ANTB: CPT | Performed by: SURGERY

## 2021-04-22 PROCEDURE — 88305 TISSUE EXAM BY PATHOLOGIST: CPT | Performed by: SURGERY

## 2021-04-22 PROCEDURE — 78195 LYMPH SYSTEM IMAGING: CPT | Performed by: SURGERY

## 2021-04-22 RX ORDER — PROCHLORPERAZINE EDISYLATE 5 MG/ML
5 INJECTION INTRAMUSCULAR; INTRAVENOUS ONCE AS NEEDED
Status: DISCONTINUED | OUTPATIENT
Start: 2021-04-22 | End: 2021-04-22

## 2021-04-22 RX ORDER — MORPHINE SULFATE 4 MG/ML
2 INJECTION, SOLUTION INTRAMUSCULAR; INTRAVENOUS EVERY 10 MIN PRN
Status: DISCONTINUED | OUTPATIENT
Start: 2021-04-22 | End: 2021-04-22

## 2021-04-22 RX ORDER — HYDROMORPHONE HYDROCHLORIDE 1 MG/ML
0.6 INJECTION, SOLUTION INTRAMUSCULAR; INTRAVENOUS; SUBCUTANEOUS EVERY 5 MIN PRN
Status: DISCONTINUED | OUTPATIENT
Start: 2021-04-22 | End: 2021-04-22

## 2021-04-22 RX ORDER — ONDANSETRON 2 MG/ML
4 INJECTION INTRAMUSCULAR; INTRAVENOUS ONCE AS NEEDED
Status: DISCONTINUED | OUTPATIENT
Start: 2021-04-22 | End: 2021-04-22

## 2021-04-22 RX ORDER — DEXAMETHASONE SODIUM PHOSPHATE 4 MG/ML
VIAL (ML) INJECTION AS NEEDED
Status: DISCONTINUED | OUTPATIENT
Start: 2021-04-22 | End: 2021-04-22 | Stop reason: SURG

## 2021-04-22 RX ORDER — ACETAMINOPHEN 500 MG
1000 TABLET ORAL EVERY 6 HOURS PRN
Qty: 60 TABLET | Refills: 0 | Status: SHIPPED | OUTPATIENT
Start: 2021-04-22 | End: 2021-12-03

## 2021-04-22 RX ORDER — HYDROCODONE BITARTRATE AND ACETAMINOPHEN 5; 325 MG/1; MG/1
1 TABLET ORAL AS NEEDED
Status: DISCONTINUED | OUTPATIENT
Start: 2021-04-22 | End: 2021-04-22

## 2021-04-22 RX ORDER — SODIUM CHLORIDE, SODIUM LACTATE, POTASSIUM CHLORIDE, CALCIUM CHLORIDE 600; 310; 30; 20 MG/100ML; MG/100ML; MG/100ML; MG/100ML
INJECTION, SOLUTION INTRAVENOUS CONTINUOUS
Status: DISCONTINUED | OUTPATIENT
Start: 2021-04-22 | End: 2021-04-22

## 2021-04-22 RX ORDER — MORPHINE SULFATE 10 MG/ML
6 INJECTION, SOLUTION INTRAMUSCULAR; INTRAVENOUS EVERY 10 MIN PRN
Status: DISCONTINUED | OUTPATIENT
Start: 2021-04-22 | End: 2021-04-22

## 2021-04-22 RX ORDER — TRAMADOL HYDROCHLORIDE 50 MG/1
50 TABLET ORAL EVERY 4 HOURS PRN
Qty: 20 TABLET | Refills: 0 | Status: SHIPPED | OUTPATIENT
Start: 2021-04-22 | End: 2021-05-19 | Stop reason: ALTCHOICE

## 2021-04-22 RX ORDER — ONDANSETRON 2 MG/ML
INJECTION INTRAMUSCULAR; INTRAVENOUS AS NEEDED
Status: DISCONTINUED | OUTPATIENT
Start: 2021-04-22 | End: 2021-04-22 | Stop reason: SURG

## 2021-04-22 RX ORDER — NEOSTIGMINE METHYLSULFATE 1 MG/ML
INJECTION INTRAVENOUS AS NEEDED
Status: DISCONTINUED | OUTPATIENT
Start: 2021-04-22 | End: 2021-04-22 | Stop reason: SURG

## 2021-04-22 RX ORDER — NALOXONE HYDROCHLORIDE 0.4 MG/ML
80 INJECTION, SOLUTION INTRAMUSCULAR; INTRAVENOUS; SUBCUTANEOUS AS NEEDED
Status: DISCONTINUED | OUTPATIENT
Start: 2021-04-22 | End: 2021-04-22

## 2021-04-22 RX ORDER — HYDROMORPHONE HYDROCHLORIDE 1 MG/ML
0.4 INJECTION, SOLUTION INTRAMUSCULAR; INTRAVENOUS; SUBCUTANEOUS EVERY 5 MIN PRN
Status: DISCONTINUED | OUTPATIENT
Start: 2021-04-22 | End: 2021-04-22

## 2021-04-22 RX ORDER — GLYCOPYRROLATE 0.2 MG/ML
INJECTION, SOLUTION INTRAMUSCULAR; INTRAVENOUS AS NEEDED
Status: DISCONTINUED | OUTPATIENT
Start: 2021-04-22 | End: 2021-04-22 | Stop reason: SURG

## 2021-04-22 RX ORDER — ROCURONIUM BROMIDE 10 MG/ML
INJECTION, SOLUTION INTRAVENOUS AS NEEDED
Status: DISCONTINUED | OUTPATIENT
Start: 2021-04-22 | End: 2021-04-22 | Stop reason: SURG

## 2021-04-22 RX ORDER — LIDOCAINE HYDROCHLORIDE 10 MG/ML
INJECTION, SOLUTION EPIDURAL; INFILTRATION; INTRACAUDAL; PERINEURAL AS NEEDED
Status: DISCONTINUED | OUTPATIENT
Start: 2021-04-22 | End: 2021-04-22 | Stop reason: SURG

## 2021-04-22 RX ORDER — HYDROMORPHONE HYDROCHLORIDE 1 MG/ML
0.2 INJECTION, SOLUTION INTRAMUSCULAR; INTRAVENOUS; SUBCUTANEOUS EVERY 5 MIN PRN
Status: DISCONTINUED | OUTPATIENT
Start: 2021-04-22 | End: 2021-04-22

## 2021-04-22 RX ORDER — ACETAMINOPHEN 500 MG
1000 TABLET ORAL ONCE
Status: COMPLETED | OUTPATIENT
Start: 2021-04-22 | End: 2021-04-22

## 2021-04-22 RX ORDER — HALOPERIDOL 5 MG/ML
0.25 INJECTION INTRAMUSCULAR ONCE AS NEEDED
Status: DISCONTINUED | OUTPATIENT
Start: 2021-04-22 | End: 2021-04-22

## 2021-04-22 RX ORDER — HEPARIN SODIUM 5000 [USP'U]/ML
5000 INJECTION, SOLUTION INTRAVENOUS; SUBCUTANEOUS ONCE
Status: COMPLETED | OUTPATIENT
Start: 2021-04-22 | End: 2021-04-22

## 2021-04-22 RX ORDER — MIDAZOLAM HYDROCHLORIDE 1 MG/ML
INJECTION INTRAMUSCULAR; INTRAVENOUS AS NEEDED
Status: DISCONTINUED | OUTPATIENT
Start: 2021-04-22 | End: 2021-04-22 | Stop reason: SURG

## 2021-04-22 RX ORDER — HYDROCODONE BITARTRATE AND ACETAMINOPHEN 5; 325 MG/1; MG/1
2 TABLET ORAL AS NEEDED
Status: DISCONTINUED | OUTPATIENT
Start: 2021-04-22 | End: 2021-04-22

## 2021-04-22 RX ORDER — MORPHINE SULFATE 4 MG/ML
4 INJECTION, SOLUTION INTRAMUSCULAR; INTRAVENOUS EVERY 10 MIN PRN
Status: DISCONTINUED | OUTPATIENT
Start: 2021-04-22 | End: 2021-04-22

## 2021-04-22 RX ORDER — CEFAZOLIN SODIUM/WATER 2 G/20 ML
2 SYRINGE (ML) INTRAVENOUS ONCE
Status: COMPLETED | OUTPATIENT
Start: 2021-04-22 | End: 2021-04-22

## 2021-04-22 RX ORDER — ISOSULFAN BLUE 50 MG/5ML
INJECTION, SOLUTION SUBCUTANEOUS AS NEEDED
Status: DISCONTINUED | OUTPATIENT
Start: 2021-04-22 | End: 2021-04-22 | Stop reason: HOSPADM

## 2021-04-22 RX ADMIN — GLYCOPYRROLATE 0.8 MG: 0.2 INJECTION, SOLUTION INTRAMUSCULAR; INTRAVENOUS at 14:33:00

## 2021-04-22 RX ADMIN — MIDAZOLAM HYDROCHLORIDE 2 MG: 1 INJECTION INTRAMUSCULAR; INTRAVENOUS at 12:19:00

## 2021-04-22 RX ADMIN — SODIUM CHLORIDE, SODIUM LACTATE, POTASSIUM CHLORIDE, CALCIUM CHLORIDE: 600; 310; 30; 20 INJECTION, SOLUTION INTRAVENOUS at 12:19:00

## 2021-04-22 RX ADMIN — DEXAMETHASONE SODIUM PHOSPHATE 4 MG: 4 MG/ML VIAL (ML) INJECTION at 13:16:00

## 2021-04-22 RX ADMIN — ROCURONIUM BROMIDE 10 MG: 10 INJECTION, SOLUTION INTRAVENOUS at 14:18:00

## 2021-04-22 RX ADMIN — NEOSTIGMINE METHYLSULFATE 4 MG: 1 INJECTION INTRAVENOUS at 14:33:00

## 2021-04-22 RX ADMIN — SODIUM CHLORIDE, SODIUM LACTATE, POTASSIUM CHLORIDE, CALCIUM CHLORIDE: 600; 310; 30; 20 INJECTION, SOLUTION INTRAVENOUS at 14:49:00

## 2021-04-22 RX ADMIN — ROCURONIUM BROMIDE 40 MG: 10 INJECTION, SOLUTION INTRAVENOUS at 12:25:00

## 2021-04-22 RX ADMIN — ONDANSETRON 4 MG: 2 INJECTION INTRAMUSCULAR; INTRAVENOUS at 14:44:00

## 2021-04-22 RX ADMIN — LIDOCAINE HYDROCHLORIDE 50 MG: 10 INJECTION, SOLUTION EPIDURAL; INFILTRATION; INTRACAUDAL; PERINEURAL at 12:24:00

## 2021-04-22 RX ADMIN — ROCURONIUM BROMIDE 10 MG: 10 INJECTION, SOLUTION INTRAVENOUS at 13:16:00

## 2021-04-22 RX ADMIN — ROCURONIUM BROMIDE 20 MG: 10 INJECTION, SOLUTION INTRAVENOUS at 13:30:00

## 2021-04-22 RX ADMIN — CEFAZOLIN SODIUM/WATER 2 G: 2 G/20 ML SYRINGE (ML) INTRAVENOUS at 12:40:00

## 2021-04-22 NOTE — OPERATIVE REPORT
Date of operation 4/22/2021    Preoperative diagnosis:  1. Upper back T2 a melanoma, clinically node-negative    Operation performed:  1. Wide local excision of a 5 x 15 cm malignant melanoma upper back  2.   Complex closure of wide local excision defect was excised and sent off for pathologic analysis as it was within close proximity to the node and had trace blue dye. This was sent off separately. The wound was thoroughly irrigated.   The clavipectoral fascia was approximated in a running fashion leslye

## 2021-04-22 NOTE — INTERVAL H&P NOTE
Patient seen and examined. No changes to the attached history and physical are noted. 39year old male presenting today for planned wide local excision melanoma left upper back and sentinel lymph node biopsy.     Dona Robbins PA-C    Department of

## 2021-04-22 NOTE — ANESTHESIA PROCEDURE NOTES
Airway  Date/Time: 4/22/2021 12:27 PM  Urgency: Elective    Airway not difficult    General Information and Staff    Patient location during procedure: OR  Anesthesiologist: Leticia Nichols MD  Resident/CRNA: Glenna Nelson CRNA  Performed: CRIS Goddard

## 2021-04-22 NOTE — BRIEF OP NOTE
Pre-Operative Diagnosis: Malignant melanoma of torso excluding breast (HCC) [C43.59]     Post-Operative Diagnosis: Malignant melanoma of torso excluding breast ([address] [C43.59]      Procedure Performed:    Wide local excision melanoma of Left upper back with

## 2021-04-22 NOTE — ANESTHESIA POSTPROCEDURE EVALUATION
Patient: Oxana Bellamy    Procedure Summary     Date: 04/22/21 Room / Location: [facility] OR  / [facility] OR    Anesthesia Start: 1219 Anesthesia Stop: 3120    Procedures:        Wide local excision melanoma of Left upper back with lymphoscintigraphy s

## 2021-04-22 NOTE — ANESTHESIA PREPROCEDURE EVALUATION
Anesthesia PreOp Note    HPI:     Peter Garvin is a 39year old male who presents for preoperative consultation requested by: German Melendez MD    Date of Surgery: 4/22/2021    Procedure(s):   Wide local excision melanoma of Left upper back with lymp 4/19/2021      lactated ringers infusion, , Intravenous, Continuous, Noel Cormier MD, Last Rate: 20 mL/hr at 04/22/21 1014, New Bag at 04/22/21 1014  ceFAZolin sodium (ANCEF/KEFZOL) 2 GM/20ML premix IV syringe 2 g, 2 g, Intravenous, Once, Noel Cormier Transportation (Medical):       Lack of Transportation (Non-Medical):   Physical Activity:       Days of Exercise per Week:       Minutes of Exercise per Session:   Stress:       Feeling of Stress :   Social Connections:       Frequency of Communication wi guardian or family member of the nature of the anesthetic plan, benefits, risks including possible dental damage if relevant, major complications, and any alternative forms of anesthetic management.    All of the patient's questions were answered to the bes

## 2021-04-23 ENCOUNTER — TELEPHONE (OUTPATIENT)
Dept: SURGERY | Facility: CLINIC | Age: 46
End: 2021-04-23

## 2021-04-23 NOTE — TELEPHONE ENCOUNTER
Called pt to check on him after his procedure yesterday with Dr. Anila Castro. Pt c/o pain in axillary area, dressings remain in place and pt will remove tomorrow. Taking tylenol and tramadol and will increase frequency if needed.  No fevers, swelling or rednes

## 2021-05-03 ENCOUNTER — TELEPHONE (OUTPATIENT)
Dept: SURGERY | Facility: CLINIC | Age: 46
End: 2021-05-03

## 2021-05-03 NOTE — TELEPHONE ENCOUNTER
Dr. Cheryl Argueta office called requesting pt's pathology report. Report was faxed on 4/29, re-faxed report, confirmation received.

## 2021-05-05 ENCOUNTER — OFFICE VISIT (OUTPATIENT)
Dept: SURGERY | Facility: CLINIC | Age: 46
End: 2021-05-05

## 2021-05-05 VITALS
WEIGHT: 156 LBS | BODY MASS INDEX: 23 KG/M2 | DIASTOLIC BLOOD PRESSURE: 91 MMHG | RESPIRATION RATE: 16 BRPM | SYSTOLIC BLOOD PRESSURE: 136 MMHG | HEART RATE: 62 BPM | OXYGEN SATURATION: 99 % | TEMPERATURE: 98 F

## 2021-05-05 DIAGNOSIS — C43.59 MALIGNANT MELANOMA OF BACK (HCC): Primary | ICD-10-CM

## 2021-05-05 PROCEDURE — 3075F SYST BP GE 130 - 139MM HG: CPT | Performed by: SURGERY

## 2021-05-05 PROCEDURE — 3080F DIAST BP >= 90 MM HG: CPT | Performed by: SURGERY

## 2021-05-05 PROCEDURE — 99024 POSTOP FOLLOW-UP VISIT: CPT | Performed by: SURGERY

## 2021-05-05 NOTE — PROGRESS NOTES
[facility]    Patient Name:  Debi Salas   YOB: 1975   Gender:  Male   Appt Date:  5/5/2021   Provider:  Julia Clarke MD   Insurance:  Thurnell Blood  Referring Provider:  Reviewed:    Current Outpatient Medications:   •  traMADol HCl 50 MG Oral Tab, Take 1 tablet (50 mg total) by mouth every 4 (four) hours as needed for Pain., Disp: 20 tablet, Rfl: 0  •  acetaminophen 500 MG Oral Tab, Take 2 tablets (1,000 mg total) by mout Physical Examination:  Physical Exam  Constitutional:       General: He is not in acute distress. Appearance: He is well-developed. Pulmonary:      Effort: Pulmonary effort is normal.   Abdominal:      General: There is no distension.       Palpations dermis and confirmsthe diagnosis of invasive melanoma.     The current specimen shows 2 separate foci of invasive malignant melanoma away from the previous biopsy site (Breslow depth 0.7 mm and 0.18 mm,  Rudy's level III and II)  in blocks C 11 and C 18 r left upper back. He is s/p for a wide excision melanoma left upper back and sentinel lymph node biopsy on 4/22/2021. Pathology returned to show excision specimen with two separate foci of invasive melanoma (Breslow depth 0.7 mm and 0.18 mm).  There was anot

## 2021-05-19 ENCOUNTER — OFFICE VISIT (OUTPATIENT)
Dept: SURGERY | Facility: CLINIC | Age: 46
End: 2021-05-19

## 2021-05-19 VITALS
SYSTOLIC BLOOD PRESSURE: 135 MMHG | OXYGEN SATURATION: 97 % | HEART RATE: 77 BPM | WEIGHT: 156 LBS | DIASTOLIC BLOOD PRESSURE: 97 MMHG | HEIGHT: 69 IN | RESPIRATION RATE: 16 BRPM | BODY MASS INDEX: 23.11 KG/M2

## 2021-05-19 DIAGNOSIS — C43.59 MELANOMA OF BACK (HCC): Primary | ICD-10-CM

## 2021-05-19 PROCEDURE — 3075F SYST BP GE 130 - 139MM HG: CPT | Performed by: SURGERY

## 2021-05-19 PROCEDURE — 99024 POSTOP FOLLOW-UP VISIT: CPT | Performed by: SURGERY

## 2021-05-19 PROCEDURE — 3008F BODY MASS INDEX DOCD: CPT | Performed by: SURGERY

## 2021-05-19 PROCEDURE — 3080F DIAST BP >= 90 MM HG: CPT | Performed by: SURGERY

## 2021-05-19 NOTE — PROGRESS NOTES
[facility]    Patient Name:  Pascual Bazzi   YOB: 1975   Gender:  Male   Appt Date:  5/19/2021   Provider:  Luciana Azar MD   Insurance:  Shahriar Garcia  Referring Provider:  Medications:   •  acetaminophen 500 MG Oral Tab, Take 2 tablets (1,000 mg total) by mouth every 6 (six) hours as needed for Pain., Disp: 60 tablet, Rfl: 0  •  Vitamin B12 100 MCG Oral Tab, Take 100 mcg by mouth daily. , Disp: , Rfl:   •  ZINC OR, Use as dir Pulmonary effort is normal.   Abdominal:      General: There is no distension. Palpations: Abdomen is soft. There is no mass. Tenderness: There is no abdominal tenderness. Musculoskeletal:         General: Normal range of motion.    Skin:     Ge site (Breslow depth 0.7 mm and 0.18 mm,  Rudy's level III and II)  in blocks C 11 and C 18 respectively.   Clinical correlation with close clinical follow-up is recommended.        TUMOR   Tumor Site  Skin of trunk: left paraspinal region         Histologi with two separate foci of invasive melanoma (Breslow depth 0.7 mm and 0.18 mm). There was another small focus of melanoma in situ adjacent to invasive melanoma, seen 6 mm from closest 9-12 o'clock margin. All margins negative.  [place] lymph nodes (2) nega

## 2021-07-01 ENCOUNTER — MED REC SCAN ONLY (OUTPATIENT)
Dept: FAMILY MEDICINE CLINIC | Facility: CLINIC | Age: 46
End: 2021-07-01

## 2021-08-18 ENCOUNTER — OFFICE VISIT (OUTPATIENT)
Dept: SURGERY | Facility: CLINIC | Age: 46
End: 2021-08-18

## 2021-08-18 VITALS
HEART RATE: 60 BPM | RESPIRATION RATE: 16 BRPM | SYSTOLIC BLOOD PRESSURE: 136 MMHG | DIASTOLIC BLOOD PRESSURE: 90 MMHG | WEIGHT: 153.81 LBS | HEIGHT: 69 IN | OXYGEN SATURATION: 98 % | BODY MASS INDEX: 22.78 KG/M2

## 2021-08-18 DIAGNOSIS — C43.59 MELANOMA OF BACK (HCC): Primary | ICD-10-CM

## 2021-08-18 PROCEDURE — 3008F BODY MASS INDEX DOCD: CPT | Performed by: SURGERY

## 2021-08-18 PROCEDURE — 99214 OFFICE O/P EST MOD 30 MIN: CPT | Performed by: SURGERY

## 2021-08-18 PROCEDURE — 3075F SYST BP GE 130 - 139MM HG: CPT | Performed by: SURGERY

## 2021-08-18 PROCEDURE — 3080F DIAST BP >= 90 MM HG: CPT | Performed by: SURGERY

## 2021-08-18 NOTE — PROGRESS NOTES
[facility]    Patient Name:  Avery Yan   YOB: 1975   Gender:  Male   Appt Date:  08/18/21   Provider:  Afshin Paige MD   Insurance:  [employer]  Referring Provider:  Reviewed:    Current Outpatient Medications:   •  Vitamin B12 100 MCG Oral Tab, Take 100 mcg by mouth daily. , Disp: , Rfl:   •  ZINC OR, Use as directed in the mouth or throat., Disp: , Rfl:   •  CALCIUM OR, Take by mouth., Disp: , Rfl:   •  acetaminophen distress. Appearance: He is well-developed. Pulmonary:      Effort: Pulmonary effort is normal.   Abdominal:      General: There is no distension. Palpations: Abdomen is soft. There is no mass. Tenderness: There is no abdominal tenderness. separate foci of invasive malignant melanoma away from the previous biopsy site (Breslow depth 0.7 mm and 0.18 mm,  Rudy's level III and II)  in blocks C 11 and C 18 respectively.   Clinical correlation with close clinical follow-up is recommended.    node biopsy on 4/22/2021. Pathology returned to show excision specimen with two separate foci of invasive melanoma (Breslow depth 0.7 mm and 0.18 mm).  There was another small focus of melanoma in situ adjacent to invasive melanoma, seen 6 mm from closest 9

## 2021-11-17 ENCOUNTER — OFFICE VISIT (OUTPATIENT)
Dept: SURGERY | Facility: CLINIC | Age: 46
End: 2021-11-17

## 2021-11-17 VITALS
TEMPERATURE: 97 F | SYSTOLIC BLOOD PRESSURE: 156 MMHG | RESPIRATION RATE: 16 BRPM | OXYGEN SATURATION: 100 % | DIASTOLIC BLOOD PRESSURE: 92 MMHG | HEART RATE: 60 BPM

## 2021-11-17 DIAGNOSIS — C43.59 MELANOMA OF BACK (HCC): Primary | ICD-10-CM

## 2021-11-17 PROCEDURE — 99214 OFFICE O/P EST MOD 30 MIN: CPT | Performed by: SURGERY

## 2021-11-17 PROCEDURE — 3077F SYST BP >= 140 MM HG: CPT | Performed by: SURGERY

## 2021-11-17 PROCEDURE — 3080F DIAST BP >= 90 MM HG: CPT | Performed by: SURGERY

## 2021-11-17 NOTE — PROGRESS NOTES
[facility]    Patient Name:  Reddy Lynn   YOB: 1975   Gender:  Male   Appt Date:  11/17/2021   Provider:  Mary Ellen Baker MD   Insurance:  Lukasz Walden  Referring Provider: ROBYN Medications:   •  acetaminophen 500 MG Oral Tab, Take 2 tablets (1,000 mg total) by mouth every 6 (six) hours as needed for Pain.  (Patient not taking: Reported on 8/18/2021 ), Disp: 60 tablet, Rfl: 0  •  Vitamin B12 100 MCG Oral Tab, Take 100 mcg by mouth adenopathy. Left: No axillary adenopathy or supraclavicular adenopathy. Abdominal:      General: There is no distension. Palpations: Abdomen is soft. There is no mass. Tenderness: There is no abdominal tenderness.    Musculoskeletal: dermal melanocytes.   Melan-A performed on block C11and C18 highlight nests of melanocytes in the dermis and confirmsthe diagnosis of invasive melanoma.     The current specimen shows 2 separate foci of invasive malignant melanoma away from the previous bio CML 2014, now in remission, followed by Dr. Flako Johnson, who presented with at least pT2a melanoma left upper back. He is s/p for a wide excision melanoma left upper back and sentinel lymph node biopsy on 4/22/2021.  Pathology returned to show excision specimen

## 2021-11-29 ENCOUNTER — DOCUMENTATION ONLY (OUTPATIENT)
Dept: SURGERY | Facility: CLINIC | Age: 46
End: 2021-11-29

## 2021-11-29 NOTE — PROGRESS NOTES
Last OV notes from Dr. Fredrick Amos visit on 11/17 faxed to Dr. Carlos Granados, Fax confirmation received.

## 2021-12-03 ENCOUNTER — OFFICE VISIT (OUTPATIENT)
Dept: FAMILY MEDICINE CLINIC | Facility: CLINIC | Age: 46
End: 2021-12-03

## 2021-12-03 VITALS
TEMPERATURE: 98 F | RESPIRATION RATE: 16 BRPM | HEART RATE: 66 BPM | DIASTOLIC BLOOD PRESSURE: 90 MMHG | HEIGHT: 69 IN | WEIGHT: 154.5 LBS | BODY MASS INDEX: 22.88 KG/M2 | OXYGEN SATURATION: 98 % | SYSTOLIC BLOOD PRESSURE: 132 MMHG

## 2021-12-03 DIAGNOSIS — Z12.11 COLON CANCER SCREENING: ICD-10-CM

## 2021-12-03 DIAGNOSIS — R22.40 MASS OF TOE: ICD-10-CM

## 2021-12-03 DIAGNOSIS — R03.0 ELEVATED BLOOD PRESSURE READING: ICD-10-CM

## 2021-12-03 DIAGNOSIS — Z00.00 ROUTINE ADULT HEALTH MAINTENANCE: Primary | ICD-10-CM

## 2021-12-03 PROCEDURE — 3080F DIAST BP >= 90 MM HG: CPT | Performed by: FAMILY MEDICINE

## 2021-12-03 PROCEDURE — 99396 PREV VISIT EST AGE 40-64: CPT | Performed by: FAMILY MEDICINE

## 2021-12-03 PROCEDURE — 3075F SYST BP GE 130 - 139MM HG: CPT | Performed by: FAMILY MEDICINE

## 2021-12-03 PROCEDURE — 99213 OFFICE O/P EST LOW 20 MIN: CPT | Performed by: FAMILY MEDICINE

## 2021-12-03 PROCEDURE — 3008F BODY MASS INDEX DOCD: CPT | Performed by: FAMILY MEDICINE

## 2021-12-05 PROBLEM — R03.0 ELEVATED BLOOD PRESSURE READING: Status: ACTIVE | Noted: 2021-12-05

## 2021-12-05 PROBLEM — R22.40 MASS OF TOE: Status: ACTIVE | Noted: 2021-12-05

## 2021-12-05 NOTE — PROGRESS NOTES
Emily Garcia is a 55year old male who presents for a complete physical exam.   HPI:   Patient states of having some episodes of elevated blood pressure. He is a on proper diet and exercise and is relatively healthy.   He is in complete remission fro (immunohistochemical stains for SOX-10 and Melan-A (appropriate control reactivity) are negative and support the above diagnosis. B.  [place] lymph node #2; dissection:   · Single small lymph node, negative for melanoma (immunohistochemical stains for S reactivity. Embedded Images      Synoptic Report       MELANOMA OF THE SKIN: Excision, Re-Excision  (Skin. Melanoma - C)      8th Edition - Protocol posted: 8/28/2019      SPECIMEN      Procedure:    Re-excision       Procedure:    [place] node(s) bi Regional Lymph Nodes (pN):    pN0       Clinical Information       C43.59 Malignant Melanoma Of Torso Excluding Breast (Hcc). Gross Description       Specimen A is labeled \"Phuctein, sentinel lymph node #1 - hot and blue\" received in formalin.  The sectioning. The surface in contact with the crust of the lesion is inked red, and the crust is trisected.  The specimen is submitted entirely as follows: C1, 12 o'clock tip; C2, 6 o'clock tip; C3, crust of the lesion submitted in edge; Z2-M39, all slices fr denies depression or anxiety  HEMATOLOGIC: denies easy bruising/bleeding/anemia/blood clot disorders  ENDOCRINE: denies weight gain/weight loss/enlargement of neck glands/polyuria/polydypsia  ALL/ASTHMA: denies allergic rhinitis/asthma    EXAM:   /90 for this visit:    Routine adult health maintenance  -     COMP METABOLIC PANEL (14); Future  -     CBC WITH DIFFERENTIAL WITH PLATELET; Future  -     LIPID PANEL;  Future  -     TSH+FREE T4; Future  -     PSA SCREEN; Future    Colon cancer screening  -

## 2021-12-08 ENCOUNTER — LAB ENCOUNTER (OUTPATIENT)
Dept: LAB | Age: 46
End: 2021-12-08
Attending: FAMILY MEDICINE
Payer: COMMERCIAL

## 2021-12-08 DIAGNOSIS — Z00.00 ROUTINE ADULT HEALTH MAINTENANCE: ICD-10-CM

## 2021-12-08 PROCEDURE — 36415 COLL VENOUS BLD VENIPUNCTURE: CPT

## 2021-12-08 PROCEDURE — 85025 COMPLETE CBC W/AUTO DIFF WBC: CPT

## 2021-12-08 PROCEDURE — 80053 COMPREHEN METABOLIC PANEL: CPT

## 2021-12-08 PROCEDURE — 84439 ASSAY OF FREE THYROXINE: CPT

## 2021-12-08 PROCEDURE — 80061 LIPID PANEL: CPT

## 2021-12-08 PROCEDURE — 84443 ASSAY THYROID STIM HORMONE: CPT

## 2021-12-21 ENCOUNTER — OFFICE VISIT (OUTPATIENT)
Dept: PODIATRY CLINIC | Facility: CLINIC | Age: 46
End: 2021-12-21

## 2021-12-21 DIAGNOSIS — M67.40 GANGLION CYST: Primary | ICD-10-CM

## 2021-12-21 PROCEDURE — 99203 OFFICE O/P NEW LOW 30 MIN: CPT | Performed by: PODIATRIST

## 2021-12-21 NOTE — PROGRESS NOTES
Branden Gomez is a 55year old male. Patient presents with:  New Patient: Right foot a lump great toe, onset month ago, pt denies any pain.         HPI:   This pleasant gentleman presents as a referral from his PCP with concerns over swelling or mass o headaches    EXAM:   There were no vitals taken for this visit. GENERAL: well developed, well nourished, in no apparent distress  EXTREMITIES:   1.  Integument: The skin on his right foot was evaluated is warm and dry there does seem to be a little bit of

## 2022-01-10 ENCOUNTER — OFFICE VISIT (OUTPATIENT)
Dept: FAMILY MEDICINE CLINIC | Facility: CLINIC | Age: 47
End: 2022-01-10
Payer: COMMERCIAL

## 2022-01-10 VITALS
TEMPERATURE: 98 F | HEART RATE: 74 BPM | BODY MASS INDEX: 22.66 KG/M2 | WEIGHT: 153 LBS | SYSTOLIC BLOOD PRESSURE: 120 MMHG | OXYGEN SATURATION: 98 % | DIASTOLIC BLOOD PRESSURE: 80 MMHG | HEIGHT: 69 IN | RESPIRATION RATE: 14 BRPM

## 2022-01-10 DIAGNOSIS — L30.9 DERMATITIS: Primary | ICD-10-CM

## 2022-01-10 PROCEDURE — 3074F SYST BP LT 130 MM HG: CPT | Performed by: FAMILY MEDICINE

## 2022-01-10 PROCEDURE — 99213 OFFICE O/P EST LOW 20 MIN: CPT | Performed by: FAMILY MEDICINE

## 2022-01-10 PROCEDURE — 3008F BODY MASS INDEX DOCD: CPT | Performed by: FAMILY MEDICINE

## 2022-01-10 PROCEDURE — 3079F DIAST BP 80-89 MM HG: CPT | Performed by: FAMILY MEDICINE

## 2022-01-10 NOTE — PROGRESS NOTES
HPI:   Royer Ortega is a 55year old male that presents for Patient presents with:  Rash: small red bump on torso area and upper arms- no pain and no itchiness    Patient has a rash on his torso and some on his arms but none on his legs he denies itc conjunctivae clear, no eye discharge        Ears: both external ear canals without any swelling or redness, both tympanic membranes appear without erythema or effusion or rupture.         Nose: both nares are patent without swelling, redness or nasal discha

## 2022-02-16 ENCOUNTER — OFFICE VISIT (OUTPATIENT)
Dept: SURGERY | Facility: CLINIC | Age: 47
End: 2022-02-16
Payer: COMMERCIAL

## 2022-02-16 VITALS
SYSTOLIC BLOOD PRESSURE: 147 MMHG | HEART RATE: 56 BPM | DIASTOLIC BLOOD PRESSURE: 94 MMHG | BODY MASS INDEX: 22 KG/M2 | OXYGEN SATURATION: 100 % | WEIGHT: 150 LBS

## 2022-02-16 DIAGNOSIS — C43.59 MALIGNANT MELANOMA OF BACK (HCC): Primary | ICD-10-CM

## 2022-02-16 PROCEDURE — 99214 OFFICE O/P EST MOD 30 MIN: CPT | Performed by: SURGERY

## 2022-02-16 PROCEDURE — 3080F DIAST BP >= 90 MM HG: CPT | Performed by: SURGERY

## 2022-02-16 PROCEDURE — 3077F SYST BP >= 140 MM HG: CPT | Performed by: SURGERY

## 2022-02-18 PROBLEM — C43.59 MALIGNANT MELANOMA OF BACK (HCC): Status: ACTIVE | Noted: 2022-02-18

## 2022-02-21 ENCOUNTER — TELEPHONE (OUTPATIENT)
Dept: SURGERY | Facility: CLINIC | Age: 47
End: 2022-02-21

## 2022-02-21 NOTE — TELEPHONE ENCOUNTER
Office visit notes from visit with Dr. Warner River on 2/16 faxed to Dr. Marielena Lang office, fax confirmation received.

## 2022-04-19 ENCOUNTER — TELEPHONE (OUTPATIENT)
Dept: WOUND CARE | Facility: HOSPITAL | Age: 47
End: 2022-04-19

## 2022-04-21 NOTE — PROGRESS NOTES
Patient presented to [facility] instead of [facility]. Seen by ALANIS.

## 2022-04-25 ENCOUNTER — OFFICE VISIT (OUTPATIENT)
Dept: HEMATOLOGY/ONCOLOGY | Age: 47
End: 2022-04-25
Attending: SPECIALIST
Payer: COMMERCIAL

## 2022-04-25 VITALS
BODY MASS INDEX: 22.91 KG/M2 | DIASTOLIC BLOOD PRESSURE: 88 MMHG | WEIGHT: 154.69 LBS | OXYGEN SATURATION: 98 % | HEIGHT: 69.02 IN | SYSTOLIC BLOOD PRESSURE: 142 MMHG | HEART RATE: 52 BPM | TEMPERATURE: 98 F | RESPIRATION RATE: 18 BRPM

## 2022-04-25 DIAGNOSIS — C92.10 CML (CHRONIC MYELOID LEUKEMIA) (HCC): Primary | ICD-10-CM

## 2022-04-25 LAB
BASOPHILS # BLD AUTO: 0.04 X10(3) UL (ref 0–0.2)
BASOPHILS NFR BLD AUTO: 0.9 %
EOSINOPHIL # BLD AUTO: 0.12 X10(3) UL (ref 0–0.7)
EOSINOPHIL NFR BLD AUTO: 2.7 %
ERYTHROCYTE [DISTWIDTH] IN BLOOD BY AUTOMATED COUNT: 12.3 %
HCT VFR BLD AUTO: 42 %
HGB BLD-MCNC: 14.7 G/DL
IMM GRANULOCYTES # BLD AUTO: 0.01 X10(3) UL (ref 0–1)
IMM GRANULOCYTES NFR BLD: 0.2 %
LYMPHOCYTES # BLD AUTO: 1.26 X10(3) UL (ref 1–4)
LYMPHOCYTES NFR BLD AUTO: 28.5 %
MCH RBC QN AUTO: 31.3 PG (ref 26–34)
MCHC RBC AUTO-ENTMCNC: 35 G/DL (ref 31–37)
MCV RBC AUTO: 89.6 FL
MONOCYTES # BLD AUTO: 0.49 X10(3) UL (ref 0.1–1)
MONOCYTES NFR BLD AUTO: 11.1 %
NEUTROPHILS # BLD AUTO: 2.5 X10 (3) UL (ref 1.5–7.7)
NEUTROPHILS # BLD AUTO: 2.5 X10(3) UL (ref 1.5–7.7)
NEUTROPHILS NFR BLD AUTO: 56.6 %
PLATELET # BLD AUTO: 220 10(3)UL (ref 150–450)
RBC # BLD AUTO: 4.69 X10(6)UL
WBC # BLD AUTO: 4.4 X10(3) UL (ref 4–11)

## 2022-04-25 PROCEDURE — 99213 OFFICE O/P EST LOW 20 MIN: CPT | Performed by: CLINICAL NURSE SPECIALIST

## 2022-05-01 LAB
BCR-ABL1, MAJOR (P210) RESULT: DETECTED
BCRABL1 INTERNATIONAL SCALE(%): 0.01 %

## 2022-05-18 ENCOUNTER — OFFICE VISIT (OUTPATIENT)
Dept: SURGERY | Facility: CLINIC | Age: 47
End: 2022-05-18
Payer: COMMERCIAL

## 2022-05-18 VITALS
TEMPERATURE: 97 F | HEART RATE: 68 BPM | OXYGEN SATURATION: 99 % | DIASTOLIC BLOOD PRESSURE: 89 MMHG | SYSTOLIC BLOOD PRESSURE: 146 MMHG | RESPIRATION RATE: 16 BRPM

## 2022-05-18 DIAGNOSIS — C43.59 MALIGNANT MELANOMA OF BACK (HCC): Primary | ICD-10-CM

## 2022-05-18 PROCEDURE — 3079F DIAST BP 80-89 MM HG: CPT | Performed by: SURGERY

## 2022-05-18 PROCEDURE — 3077F SYST BP >= 140 MM HG: CPT | Performed by: SURGERY

## 2022-05-18 PROCEDURE — 99214 OFFICE O/P EST MOD 30 MIN: CPT | Performed by: SURGERY

## 2022-05-19 ENCOUNTER — TELEPHONE (OUTPATIENT)
Dept: SURGERY | Facility: CLINIC | Age: 47
End: 2022-05-19

## 2022-05-19 NOTE — TELEPHONE ENCOUNTER
Faxed over Pt Progress notes from 05/18/22 to Dr. Natalio Valdez fax # [phone].    Per Dr Timur Perales

## 2022-06-30 ENCOUNTER — OFFICE VISIT (OUTPATIENT)
Dept: HEMATOLOGY/ONCOLOGY | Facility: HOSPITAL | Age: 47
End: 2022-06-30
Attending: SPECIALIST
Payer: COMMERCIAL

## 2022-06-30 VITALS
SYSTOLIC BLOOD PRESSURE: 154 MMHG | DIASTOLIC BLOOD PRESSURE: 96 MMHG | WEIGHT: 152 LBS | BODY MASS INDEX: 22.51 KG/M2 | OXYGEN SATURATION: 99 % | RESPIRATION RATE: 16 BRPM | HEART RATE: 75 BPM | HEIGHT: 69.02 IN | TEMPERATURE: 98 F

## 2022-06-30 DIAGNOSIS — C92.10 CML (CHRONIC MYELOID LEUKEMIA) (HCC): Primary | ICD-10-CM

## 2022-06-30 LAB
ALBUMIN SERPL-MCNC: 4.1 G/DL (ref 3.4–5)
ALBUMIN/GLOB SERPL: 1.3 {RATIO} (ref 1–2)
ALP LIVER SERPL-CCNC: 70 U/L
ALT SERPL-CCNC: 34 U/L
ANION GAP SERPL CALC-SCNC: 6 MMOL/L (ref 0–18)
AST SERPL-CCNC: 22 U/L (ref 15–37)
BASOPHILS # BLD AUTO: 0.01 X10(3) UL (ref 0–0.2)
BASOPHILS NFR BLD AUTO: 0.2 %
BILIRUB SERPL-MCNC: 1.1 MG/DL (ref 0.1–2)
BUN BLD-MCNC: 15 MG/DL (ref 7–18)
CALCIUM BLD-MCNC: 9.1 MG/DL (ref 8.5–10.1)
CHLORIDE SERPL-SCNC: 106 MMOL/L (ref 98–112)
CO2 SERPL-SCNC: 27 MMOL/L (ref 21–32)
CREAT BLD-MCNC: 0.97 MG/DL
EOSINOPHIL # BLD AUTO: 0 X10(3) UL (ref 0–0.7)
EOSINOPHIL NFR BLD AUTO: 0 %
ERYTHROCYTE [DISTWIDTH] IN BLOOD BY AUTOMATED COUNT: 12.4 %
FASTING STATUS PATIENT QL REPORTED: NO
GLOBULIN PLAS-MCNC: 3.2 G/DL (ref 2.8–4.4)
GLUCOSE BLD-MCNC: 95 MG/DL (ref 70–99)
HCT VFR BLD AUTO: 44.2 %
HGB BLD-MCNC: 15.2 G/DL
IMM GRANULOCYTES # BLD AUTO: 0.03 X10(3) UL (ref 0–1)
IMM GRANULOCYTES NFR BLD: 0.6 %
LYMPHOCYTES # BLD AUTO: 1.08 X10(3) UL (ref 1–4)
LYMPHOCYTES NFR BLD AUTO: 22.9 %
MCH RBC QN AUTO: 31.5 PG (ref 26–34)
MCHC RBC AUTO-ENTMCNC: 34.4 G/DL (ref 31–37)
MCV RBC AUTO: 91.5 FL
MONOCYTES # BLD AUTO: 0.59 X10(3) UL (ref 0.1–1)
MONOCYTES NFR BLD AUTO: 12.5 %
NEUTROPHILS # BLD AUTO: 3.01 X10 (3) UL (ref 1.5–7.7)
NEUTROPHILS # BLD AUTO: 3.01 X10(3) UL (ref 1.5–7.7)
NEUTROPHILS NFR BLD AUTO: 63.8 %
OSMOLALITY SERPL CALC.SUM OF ELEC: 289 MOSM/KG (ref 275–295)
PLATELET # BLD AUTO: 224 10(3)UL (ref 150–450)
POTASSIUM SERPL-SCNC: 4.2 MMOL/L (ref 3.5–5.1)
PROT SERPL-MCNC: 7.3 G/DL (ref 6.4–8.2)
RBC # BLD AUTO: 4.83 X10(6)UL
SODIUM SERPL-SCNC: 139 MMOL/L (ref 136–145)
WBC # BLD AUTO: 4.7 X10(3) UL (ref 4–11)

## 2022-06-30 PROCEDURE — 99214 OFFICE O/P EST MOD 30 MIN: CPT | Performed by: SPECIALIST

## 2022-07-08 LAB
BCR-ABL1, MAJOR (P210) RESULT: NOT DETECTED
BCRABL1 INTERNATIONAL SCALE(%): 0 %

## 2022-10-12 ENCOUNTER — OFFICE VISIT (OUTPATIENT)
Dept: HEMATOLOGY/ONCOLOGY | Age: 47
End: 2022-10-12
Attending: SPECIALIST
Payer: COMMERCIAL

## 2022-10-12 VITALS
OXYGEN SATURATION: 99 % | TEMPERATURE: 97 F | BODY MASS INDEX: 22 KG/M2 | HEART RATE: 69 BPM | RESPIRATION RATE: 18 BRPM | HEIGHT: 69.02 IN | SYSTOLIC BLOOD PRESSURE: 142 MMHG | DIASTOLIC BLOOD PRESSURE: 96 MMHG

## 2022-10-12 DIAGNOSIS — R79.89 ABNORMAL LFTS: ICD-10-CM

## 2022-10-12 DIAGNOSIS — C92.10 CML (CHRONIC MYELOID LEUKEMIA) (HCC): Primary | ICD-10-CM

## 2022-10-12 LAB
ALBUMIN SERPL-MCNC: 4.4 G/DL (ref 3.4–5)
ALBUMIN/GLOB SERPL: 1.4 {RATIO} (ref 1–2)
ALP LIVER SERPL-CCNC: 76 U/L
ALT SERPL-CCNC: 59 U/L
ANION GAP SERPL CALC-SCNC: 5 MMOL/L (ref 0–18)
AST SERPL-CCNC: 44 U/L (ref 15–37)
BASOPHILS # BLD AUTO: 0.03 X10(3) UL (ref 0–0.2)
BASOPHILS NFR BLD AUTO: 0.6 %
BILIRUB SERPL-MCNC: 1.2 MG/DL (ref 0.1–2)
BUN BLD-MCNC: 13 MG/DL (ref 7–18)
CALCIUM BLD-MCNC: 8.8 MG/DL (ref 8.5–10.1)
CHLORIDE SERPL-SCNC: 106 MMOL/L (ref 98–112)
CO2 SERPL-SCNC: 27 MMOL/L (ref 21–32)
CREAT BLD-MCNC: 0.91 MG/DL
EOSINOPHIL # BLD AUTO: 0.06 X10(3) UL (ref 0–0.7)
EOSINOPHIL NFR BLD AUTO: 1.2 %
ERYTHROCYTE [DISTWIDTH] IN BLOOD BY AUTOMATED COUNT: 12.2 %
FASTING STATUS PATIENT QL REPORTED: NO
GFR SERPLBLD BASED ON 1.73 SQ M-ARVRAT: 105 ML/MIN/1.73M2 (ref 60–?)
GLOBULIN PLAS-MCNC: 3.2 G/DL (ref 2.8–4.4)
GLUCOSE BLD-MCNC: 97 MG/DL (ref 70–99)
HCT VFR BLD AUTO: 45.9 %
HGB BLD-MCNC: 16.1 G/DL
IMM GRANULOCYTES # BLD AUTO: 0.01 X10(3) UL (ref 0–1)
IMM GRANULOCYTES NFR BLD: 0.2 %
LYMPHOCYTES # BLD AUTO: 1.05 X10(3) UL (ref 1–4)
LYMPHOCYTES NFR BLD AUTO: 21.7 %
MCH RBC QN AUTO: 31.4 PG (ref 26–34)
MCHC RBC AUTO-ENTMCNC: 35.1 G/DL (ref 31–37)
MCV RBC AUTO: 89.6 FL
MONOCYTES # BLD AUTO: 0.45 X10(3) UL (ref 0.1–1)
MONOCYTES NFR BLD AUTO: 9.3 %
NEUTROPHILS # BLD AUTO: 3.23 X10 (3) UL (ref 1.5–7.7)
NEUTROPHILS # BLD AUTO: 3.23 X10(3) UL (ref 1.5–7.7)
NEUTROPHILS NFR BLD AUTO: 67 %
OSMOLALITY SERPL CALC.SUM OF ELEC: 286 MOSM/KG (ref 275–295)
PLATELET # BLD AUTO: 243 10(3)UL (ref 150–450)
POTASSIUM SERPL-SCNC: 4.3 MMOL/L (ref 3.5–5.1)
PROT SERPL-MCNC: 7.6 G/DL (ref 6.4–8.2)
RBC # BLD AUTO: 5.12 X10(6)UL
SODIUM SERPL-SCNC: 138 MMOL/L (ref 136–145)
WBC # BLD AUTO: 4.8 X10(3) UL (ref 4–11)

## 2022-10-12 PROCEDURE — 99214 OFFICE O/P EST MOD 30 MIN: CPT | Performed by: SPECIALIST

## 2022-10-12 NOTE — PROGRESS NOTES
Patient is here today for follow up with Kyara Gee  for Chronic Myeloid Leukemia. Patient denies pain. Stated he is feeling good. Medication list reviewed. Stated no change in medical history. Education Record    Learner:  Patient      Disease / Diagnosis: Chronic Myeloid Leukemia    Barriers / Limitations:  None   Comments:    Method:  Brief focused, Discussion, Printed material and Reinforcement   Comments:    General Topics:  Medication, Pain, Procedure and Plan of care reviewed   Comments:    Outcome:  Shows understanding   Comments:    AVS provided and follow up reviewed. Patient instructed to call as needed.

## 2022-10-18 ENCOUNTER — PATIENT MESSAGE (OUTPATIENT)
Dept: FAMILY MEDICINE CLINIC | Facility: CLINIC | Age: 47
End: 2022-10-18

## 2022-10-18 DIAGNOSIS — L81.4 OTHER MELANIN HYPERPIGMENTATION: Primary | ICD-10-CM

## 2022-10-18 NOTE — TELEPHONE ENCOUNTER
From: Kyle Gilliam  To: Savita Zayas MD  Sent: 10/18/2022 2:49 PM CDT  Subject: Bernie Dumont Dr. all is great by you! I left your staff a message last week but have not heard anything since. I have another appt with Dr. Jaymie Paredes next Wednesday and was told 6 months ago that I'd need a new updated referral prior to this appointment. Can you please have the referral sent to his office and confirm its been sent so I don't have an issue with my appointment next Wednesday please for a routine skin checkup?

## 2022-10-22 LAB
BCR-ABL1, MAJOR (P210) RESULT: NOT DETECTED
BCRABL1 INTERNATIONAL SCALE(%): 0 %

## 2022-11-14 PROBLEM — L71.9 ROSACEA: Status: ACTIVE | Noted: 2022-11-14

## 2022-11-16 ENCOUNTER — OFFICE VISIT (OUTPATIENT)
Dept: SURGERY | Facility: CLINIC | Age: 47
End: 2022-11-16
Payer: COMMERCIAL

## 2022-11-16 VITALS
SYSTOLIC BLOOD PRESSURE: 153 MMHG | TEMPERATURE: 97 F | OXYGEN SATURATION: 99 % | DIASTOLIC BLOOD PRESSURE: 95 MMHG | HEART RATE: 66 BPM | RESPIRATION RATE: 16 BRPM | HEIGHT: 69 IN | BODY MASS INDEX: 22.87 KG/M2 | WEIGHT: 154.38 LBS

## 2022-11-16 DIAGNOSIS — C43.59 MALIGNANT MELANOMA OF BACK (HCC): Primary | ICD-10-CM

## 2022-11-16 PROCEDURE — 3077F SYST BP >= 140 MM HG: CPT | Performed by: SURGERY

## 2022-11-16 PROCEDURE — 99214 OFFICE O/P EST MOD 30 MIN: CPT | Performed by: SURGERY

## 2022-11-16 PROCEDURE — 3008F BODY MASS INDEX DOCD: CPT | Performed by: SURGERY

## 2022-11-16 PROCEDURE — 3080F DIAST BP >= 90 MM HG: CPT | Performed by: SURGERY

## 2022-11-16 RX ORDER — MULTIVITAMIN
1 TABLET ORAL DAILY
COMMUNITY

## 2022-11-28 ENCOUNTER — OFFICE VISIT (OUTPATIENT)
Dept: FAMILY MEDICINE CLINIC | Facility: CLINIC | Age: 47
End: 2022-11-28
Payer: COMMERCIAL

## 2022-11-28 VITALS
BODY MASS INDEX: 22.98 KG/M2 | TEMPERATURE: 98 F | SYSTOLIC BLOOD PRESSURE: 130 MMHG | DIASTOLIC BLOOD PRESSURE: 88 MMHG | WEIGHT: 155.13 LBS | OXYGEN SATURATION: 98 % | HEART RATE: 82 BPM | HEIGHT: 69 IN | RESPIRATION RATE: 16 BRPM

## 2022-11-28 DIAGNOSIS — M25.431 SWELLING OF RIGHT WRIST: Primary | ICD-10-CM

## 2022-11-28 DIAGNOSIS — Z28.21 REFUSED INFLUENZA VACCINE: ICD-10-CM

## 2022-11-28 PROCEDURE — 3079F DIAST BP 80-89 MM HG: CPT | Performed by: FAMILY MEDICINE

## 2022-11-28 PROCEDURE — 3008F BODY MASS INDEX DOCD: CPT | Performed by: FAMILY MEDICINE

## 2022-11-28 PROCEDURE — 99214 OFFICE O/P EST MOD 30 MIN: CPT | Performed by: FAMILY MEDICINE

## 2022-11-28 PROCEDURE — 3075F SYST BP GE 130 - 139MM HG: CPT | Performed by: FAMILY MEDICINE

## 2023-01-07 NOTE — PROGRESS NOTES
[facility] Progress Note      Patient Name: Marielena Riley   YOB: 1975  Medical Record Number: [id number]  Attending Physician: Rosanne Leon. Jamie Ely M.D. The [facility] [id number] makes medical notes like these available to patients in the interest of transparency. Please be advised this is a medical document. Medical documents are intended to carry relevant information, facts as evident, and the clinical opinion of the practitioner. The medical note is intended as peer to peer communication and may appear blunt or direct. It is written in medical language and may contain abbreviations or verbiage that are unfamiliar. Date of Visit: 1/10/2023      Chief Complaint  Chronic myeloid leukemia - follow up. Oncologic History  Odilia Bates is a 52year old male who presented to emergency room on 01/02/2014 with complaints of substernal chest discomfort. A complete blood count revealed a total white blood cell count of 133 K/mcl. Quantitative PCR for BCR/ABL showed a ratio of 0.10376. 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 At diagnosis patient did not have an enlarged spleen. His platelet count was normal and there were no basophils or blasts in the peripheral blood. His Minor score was low at 0.55. While awaiting confirmation of the diagnosis, he was started on hydroxyruea. In mid 01/2014 he began nilotinib. EKG performed one week after starting the medication as compared to the EKG performed prior to the start of medication showed no QTc prolongation.     Patient was evaluated by Dr. Criselda Mensah at the [facility] and the decision was made not to pursue stem cell transplant or even HLA typing of the patient/siblings at this time. Bone marrow biopsy on 07/09/2014 showed a complete cytogenetic response and quantitative PCR for BCR/ABL on the same day showed an MR3 major molecular response. In summer 2015 patient discontinued the medication as he and his wife were trying for a child. He restarted in 11/2015. Patient discontinued therapy in mid 2017 in the face of prolonged MMR. Routine follow up laboratory studies on 04/25/2022 showed a detectable BCR-ABL1 IS of 0.0086. History of Present Illness  Patient returns for follow. Patient has no complaints. He denies any fevers, night sweats, weight loss. He denies any abdominal pain. He continues regular follow up with dermatology. Performance Status   Karnofsky 100% - Normal, no complaints. Past Medical History (historical data, reviewed by physician)  CML (as above); melanoma. Past Surgical History (historical data, reviewed by physician)  Spine surgery; vasectomy; wide excision and sentinel lymph node biopsy for melanoma. Family History (historical data, reviewed by physician)  No known family history of hematologic malignancy. Patient has one brother and one sister, neither lives in [place]. Social History (historical data, reviewed by physician)  Denies tobacco and illicit drug use; uses alcohol socially. Current Medications   No outpatient medications have been marked as taking for the 1/10/23 encounter (Appointment) with Elise Santillan MD.  Allergies   Mr. Jung Owusu has No Known Allergies. Vital Signs   There were no vitals taken for this visit. Physical Examination   Constitutional Well developed and well nourished; in no apparent distress; appears close to chronological age. Head Normocephalic and atraumatic. Eyes Conjunctiva clear; sclera anicteric. ENMT External nose normal; external ears normal.    Neck Supple; no masses. Hematologic/Lymphatic No petechiae or purpura.  No cervical, supraclavicular, axillary adenopathy. Neck Supple, no masses. Respiratory Normal effort; no respiratory distress. Clear to auscultation bilaterally. Cardiovascular Regular rate and rhythm. Normal S1S2. Abdomen Soft, non-tender, non-distended, normoactive bowel sounds. No hepatosplenomegaly. Extremities No lower extremity edema. Skin Well healed surgical scar upper left back. Neurologic Motor and sensory grossly intact. Psychiatric Mood and affect appropriate. Laboratory   Recent Results (from the past 24 hour(s))   COMP METABOLIC PANEL (14)    Collection Time: 01/10/23  1:10 PM   Result Value Ref Range    Glucose 94 70 - 99 mg/dL    Sodium 137 136 - 145 mmol/L    Potassium 4.4 3.5 - 5.1 mmol/L    Chloride 106 98 - 112 mmol/L    CO2 27.0 21.0 - 32.0 mmol/L    Anion Gap 4 0 - 18 mmol/L    BUN 12 7 - 18 mg/dL    Creatinine 0.94 0.70 - 1.30 mg/dL    Calcium, Total 8.9 8.5 - 10.1 mg/dL    Calculated Osmolality 284 275 - 295 mOsm/kg    eGFR-Cr 101 >=60 mL/min/1.73m2    AST 24 15 - 37 U/L    ALT 33 16 - 61 U/L    Alkaline Phosphatase 63 45 - 117 U/L    Bilirubin, Total 0.9 0.1 - 2.0 mg/dL    Total Protein 7.1 6.4 - 8.2 g/dL    Albumin 4.2 3.4 - 5.0 g/dL    Globulin  2.9 2.8 - 4.4 g/dL    A/G Ratio 1.4 1.0 - 2.0    Patient Fasting for CMP?  No    CBC W/ DIFFERENTIAL    Collection Time: 01/10/23  1:10 PM   Result Value Ref Range    WBC 6.2 4.0 - 11.0 x10(3) uL    RBC 4.95 4.30 - 5.70 x10(6)uL    HGB 15.2 13.0 - 17.5 g/dL    HCT 44.2 39.0 - 53.0 %    .0 150.0 - 450.0 10(3)uL    MCV 89.3 80.0 - 100.0 fL    MCH 30.7 26.0 - 34.0 pg    MCHC 34.4 31.0 - 37.0 g/dL    RDW 11.9 %    Neutrophil Absolute Prelim 4.11 1.50 - 7.70 x10 (3) uL    Neutrophil Absolute 4.11 1.50 - 7.70 x10(3) uL    Lymphocyte Absolute 1.34 1.00 - 4.00 x10(3) uL    Monocyte Absolute 0.59 0.10 - 1.00 x10(3) uL    Eosinophil Absolute 0.06 0.00 - 0.70 x10(3) uL    Basophil Absolute 0.04 0.00 - 0.20 x10(3) uL    Immature Granulocyte Absolute 0. 03 0.00 - 1.00 x10(3) uL    Neutrophil % 66.6 %    Lymphocyte % 21.7 %    Monocyte % 9.6 %    Eosinophil % 1.0 %    Basophil % 0.6 %    Immature Granulocyte % 0.5 %     Impression and Plan   1. CML: Patient has chronic phase disease with a low Minor score at diagnosis. Patient remains off therapy. Quantitative PCR for BCR/ABL in 04/2022 was newly elevated but then undetectable in 06/2022. Await results today. If he remains in a MMR, can continue to follow off therapy with repeat labs in 6 months. 2.   Abnormal LFTs: Nonspecific and mild. Will repeat in 3 months. Planned Follow Up   Patient will be contacted with his laboratory results; he will return for follow up in 6 months. Electronically signed by:    Kelly Cohen M.D.   Associate Medical Director of Oncology [facility], [place], [place]

## 2023-01-10 ENCOUNTER — OFFICE VISIT (OUTPATIENT)
Dept: HEMATOLOGY/ONCOLOGY | Age: 48
End: 2023-01-10
Attending: SPECIALIST
Payer: COMMERCIAL

## 2023-01-10 VITALS
HEIGHT: 69.02 IN | BODY MASS INDEX: 23.2 KG/M2 | OXYGEN SATURATION: 98 % | WEIGHT: 156.63 LBS | DIASTOLIC BLOOD PRESSURE: 86 MMHG | TEMPERATURE: 97 F | RESPIRATION RATE: 18 BRPM | SYSTOLIC BLOOD PRESSURE: 140 MMHG | HEART RATE: 61 BPM

## 2023-01-10 DIAGNOSIS — C92.10 CML (CHRONIC MYELOID LEUKEMIA) (HCC): Primary | ICD-10-CM

## 2023-01-10 LAB
ALBUMIN SERPL-MCNC: 4.2 G/DL (ref 3.4–5)
ALBUMIN/GLOB SERPL: 1.4 {RATIO} (ref 1–2)
ALP LIVER SERPL-CCNC: 63 U/L
ALT SERPL-CCNC: 33 U/L
ANION GAP SERPL CALC-SCNC: 4 MMOL/L (ref 0–18)
AST SERPL-CCNC: 24 U/L (ref 15–37)
BASOPHILS # BLD AUTO: 0.04 X10(3) UL (ref 0–0.2)
BASOPHILS NFR BLD AUTO: 0.6 %
BILIRUB SERPL-MCNC: 0.9 MG/DL (ref 0.1–2)
BUN BLD-MCNC: 12 MG/DL (ref 7–18)
CALCIUM BLD-MCNC: 8.9 MG/DL (ref 8.5–10.1)
CHLORIDE SERPL-SCNC: 106 MMOL/L (ref 98–112)
CO2 SERPL-SCNC: 27 MMOL/L (ref 21–32)
CREAT BLD-MCNC: 0.94 MG/DL
EOSINOPHIL # BLD AUTO: 0.06 X10(3) UL (ref 0–0.7)
EOSINOPHIL NFR BLD AUTO: 1 %
ERYTHROCYTE [DISTWIDTH] IN BLOOD BY AUTOMATED COUNT: 11.9 %
FASTING STATUS PATIENT QL REPORTED: NO
GFR SERPLBLD BASED ON 1.73 SQ M-ARVRAT: 101 ML/MIN/1.73M2 (ref 60–?)
GLOBULIN PLAS-MCNC: 2.9 G/DL (ref 2.8–4.4)
GLUCOSE BLD-MCNC: 94 MG/DL (ref 70–99)
HCT VFR BLD AUTO: 44.2 %
HGB BLD-MCNC: 15.2 G/DL
IMM GRANULOCYTES # BLD AUTO: 0.03 X10(3) UL (ref 0–1)
IMM GRANULOCYTES NFR BLD: 0.5 %
LYMPHOCYTES # BLD AUTO: 1.34 X10(3) UL (ref 1–4)
LYMPHOCYTES NFR BLD AUTO: 21.7 %
MCH RBC QN AUTO: 30.7 PG (ref 26–34)
MCHC RBC AUTO-ENTMCNC: 34.4 G/DL (ref 31–37)
MCV RBC AUTO: 89.3 FL
MONOCYTES # BLD AUTO: 0.59 X10(3) UL (ref 0.1–1)
MONOCYTES NFR BLD AUTO: 9.6 %
NEUTROPHILS # BLD AUTO: 4.11 X10 (3) UL (ref 1.5–7.7)
NEUTROPHILS # BLD AUTO: 4.11 X10(3) UL (ref 1.5–7.7)
NEUTROPHILS NFR BLD AUTO: 66.6 %
OSMOLALITY SERPL CALC.SUM OF ELEC: 284 MOSM/KG (ref 275–295)
PLATELET # BLD AUTO: 274 10(3)UL (ref 150–450)
POTASSIUM SERPL-SCNC: 4.4 MMOL/L (ref 3.5–5.1)
PROT SERPL-MCNC: 7.1 G/DL (ref 6.4–8.2)
RBC # BLD AUTO: 4.95 X10(6)UL
SODIUM SERPL-SCNC: 137 MMOL/L (ref 136–145)
WBC # BLD AUTO: 6.2 X10(3) UL (ref 4–11)

## 2023-01-10 PROCEDURE — 99214 OFFICE O/P EST MOD 30 MIN: CPT | Performed by: SPECIALIST

## 2023-01-10 NOTE — PROGRESS NOTES
Patient is here today for follow up with Hawa Christensen  for Chronic Myeloid Leukemia. Patient denies pain. Stated he is feeling good. Medication list and medical history were reviewed and updated. Education Record    Learner:  Patient     Disease / Diagnosis: CML    Barriers / Limitations:  None   Comments:    Method:  Brief focused, Discussion, Printed material and Reinforcement   Comments:    General Topics:  Medication, Pain, Procedure and Plan of care reviewed   Comments:    Outcome:  Shows understanding   Comments:      AVS provided and follow up reviewed. Patient instructed to call as needed.

## 2023-01-11 ENCOUNTER — APPOINTMENT (OUTPATIENT)
Dept: HEMATOLOGY/ONCOLOGY | Age: 48
End: 2023-01-11
Attending: SPECIALIST
Payer: COMMERCIAL

## 2023-01-23 LAB
BCR-ABL1, MAJOR (P210) RESULT: NOT DETECTED
BCRABL1 INTERNATIONAL SCALE(%): 0 %

## 2023-05-19 ENCOUNTER — OFFICE VISIT (OUTPATIENT)
Dept: SURGERY | Facility: CLINIC | Age: 48
End: 2023-05-19
Payer: COMMERCIAL

## 2023-05-19 VITALS
HEART RATE: 54 BPM | RESPIRATION RATE: 16 BRPM | WEIGHT: 151 LBS | BODY MASS INDEX: 22.36 KG/M2 | OXYGEN SATURATION: 100 % | DIASTOLIC BLOOD PRESSURE: 77 MMHG | HEIGHT: 69 IN | SYSTOLIC BLOOD PRESSURE: 138 MMHG | TEMPERATURE: 98 F

## 2023-05-19 DIAGNOSIS — C43.59 MALIGNANT MELANOMA OF BACK (HCC): Primary | ICD-10-CM

## 2023-05-19 PROCEDURE — 3075F SYST BP GE 130 - 139MM HG: CPT | Performed by: SURGERY

## 2023-05-19 PROCEDURE — 3008F BODY MASS INDEX DOCD: CPT | Performed by: SURGERY

## 2023-05-19 PROCEDURE — 3078F DIAST BP <80 MM HG: CPT | Performed by: SURGERY

## 2023-05-19 PROCEDURE — 99214 OFFICE O/P EST MOD 30 MIN: CPT | Performed by: SURGERY

## 2023-07-18 ENCOUNTER — APPOINTMENT (OUTPATIENT)
Dept: HEMATOLOGY/ONCOLOGY | Age: 48
End: 2023-07-18
Attending: SPECIALIST
Payer: COMMERCIAL

## 2023-07-19 ENCOUNTER — OFFICE VISIT (OUTPATIENT)
Dept: HEMATOLOGY/ONCOLOGY | Age: 48
End: 2023-07-19
Attending: SPECIALIST
Payer: COMMERCIAL

## 2023-07-19 VITALS
WEIGHT: 152.31 LBS | HEIGHT: 69.02 IN | OXYGEN SATURATION: 100 % | BODY MASS INDEX: 22.56 KG/M2 | TEMPERATURE: 98 F | RESPIRATION RATE: 18 BRPM | HEART RATE: 58 BPM | SYSTOLIC BLOOD PRESSURE: 137 MMHG | DIASTOLIC BLOOD PRESSURE: 89 MMHG

## 2023-07-19 DIAGNOSIS — C92.10 CML (CHRONIC MYELOID LEUKEMIA) (HCC): Primary | ICD-10-CM

## 2023-07-19 LAB
ALBUMIN SERPL-MCNC: 4 G/DL (ref 3.4–5)
ALBUMIN/GLOB SERPL: 1.2 {RATIO} (ref 1–2)
ALP LIVER SERPL-CCNC: 82 U/L
ALT SERPL-CCNC: 52 U/L
ANION GAP SERPL CALC-SCNC: 2 MMOL/L (ref 0–18)
AST SERPL-CCNC: 36 U/L (ref 15–37)
BASOPHILS # BLD AUTO: 0.06 X10(3) UL (ref 0–0.2)
BASOPHILS NFR BLD AUTO: 1.3 %
BILIRUB SERPL-MCNC: 0.8 MG/DL (ref 0.1–2)
BUN BLD-MCNC: 17 MG/DL (ref 7–18)
CALCIUM BLD-MCNC: 8.7 MG/DL (ref 8.5–10.1)
CHLORIDE SERPL-SCNC: 106 MMOL/L (ref 98–112)
CO2 SERPL-SCNC: 27 MMOL/L (ref 21–32)
CREAT BLD-MCNC: 0.9 MG/DL
EOSINOPHIL # BLD AUTO: 0.08 X10(3) UL (ref 0–0.7)
EOSINOPHIL NFR BLD AUTO: 1.8 %
ERYTHROCYTE [DISTWIDTH] IN BLOOD BY AUTOMATED COUNT: 12 %
FASTING STATUS PATIENT QL REPORTED: NO
GFR SERPLBLD BASED ON 1.73 SQ M-ARVRAT: 106 ML/MIN/1.73M2 (ref 60–?)
GLOBULIN PLAS-MCNC: 3.3 G/DL (ref 2.8–4.4)
GLUCOSE BLD-MCNC: 93 MG/DL (ref 70–99)
HCT VFR BLD AUTO: 43.6 %
HGB BLD-MCNC: 15.3 G/DL
IMM GRANULOCYTES # BLD AUTO: 0.02 X10(3) UL (ref 0–1)
IMM GRANULOCYTES NFR BLD: 0.4 %
LYMPHOCYTES # BLD AUTO: 1.08 X10(3) UL (ref 1–4)
LYMPHOCYTES NFR BLD AUTO: 24.3 %
MCH RBC QN AUTO: 31.4 PG (ref 26–34)
MCHC RBC AUTO-ENTMCNC: 35.1 G/DL (ref 31–37)
MCV RBC AUTO: 89.3 FL
MONOCYTES # BLD AUTO: 0.48 X10(3) UL (ref 0.1–1)
MONOCYTES NFR BLD AUTO: 10.8 %
NEUTROPHILS # BLD AUTO: 2.73 X10 (3) UL (ref 1.5–7.7)
NEUTROPHILS # BLD AUTO: 2.73 X10(3) UL (ref 1.5–7.7)
NEUTROPHILS NFR BLD AUTO: 61.4 %
OSMOLALITY SERPL CALC.SUM OF ELEC: 281 MOSM/KG (ref 275–295)
PLATELET # BLD AUTO: 238 10(3)UL (ref 150–450)
POTASSIUM SERPL-SCNC: 4.4 MMOL/L (ref 3.5–5.1)
PROT SERPL-MCNC: 7.3 G/DL (ref 6.4–8.2)
RBC # BLD AUTO: 4.88 X10(6)UL
SODIUM SERPL-SCNC: 135 MMOL/L (ref 136–145)
WBC # BLD AUTO: 4.5 X10(3) UL (ref 4–11)

## 2023-07-19 PROCEDURE — 99214 OFFICE O/P EST MOD 30 MIN: CPT | Performed by: SPECIALIST

## 2023-07-19 NOTE — PROGRESS NOTES
Pt here for 6 month MD follow up, h/o CML. Pt reports feeling good, no new health concerns. Taking MVI only. Education Record    Learner:  Patient    Disease / Diagnosis: CML follow up. Barriers / Limitations:  None   Comments:    Method:  Discussion and Reinforcement   Comments:    General Topics:  Plan of care reviewed   Comments:    Outcome:  Shows understanding   Comments:    Pt reviewed AVS via [employer].

## 2023-07-27 LAB — INTERPRETATION: NEGATIVE

## 2024-01-16 ENCOUNTER — TELEPHONE (OUTPATIENT)
Dept: FAMILY MEDICINE CLINIC | Facility: CLINIC | Age: 49
End: 2024-01-16

## 2024-01-16 NOTE — TELEPHONE ENCOUNTER
Appointment Request From: Benny Acosta      With Provider: Lamin Corona [-Primary Care Physician-]      Preferred Date Range: From 4/1/2024 To 4/1/2024      Preferred Times: Any      Reason: To address the following health maintenance concerns.   Colorectal Cancer Screening      Comments:   Hello, I'd like to coordinate on the 1st of April when my wife (teacher) has off in order to drive me to and from.  Please confirm this date is available and what times.  Preferably the morning would be best thanks.

## 2024-01-16 NOTE — TELEPHONE ENCOUNTER
Future Appointments   Date Time Provider Department Center   1/17/2024  9:30 AM Geovani Brown MD PF HEM ONC [place]   1/17/2024 11:40 AM Lamin Corona MD [facility] [address] [facility]   5/3/2024 12:00 PM Noel Cormier MD [facility] Surg[facility]Onc

## 2024-01-16 NOTE — TELEPHONE ENCOUNTER
Last OV 11/28/22    Please schedule patient for annual physical with Dr. Corona. Colorectal cancer screening can be discussed then.

## 2024-01-17 ENCOUNTER — OFFICE VISIT (OUTPATIENT)
Dept: FAMILY MEDICINE CLINIC | Facility: CLINIC | Age: 49
End: 2024-01-17
Payer: COMMERCIAL

## 2024-01-17 ENCOUNTER — OFFICE VISIT (OUTPATIENT)
Dept: HEMATOLOGY/ONCOLOGY | Age: 49
End: 2024-01-17
Attending: SPECIALIST
Payer: COMMERCIAL

## 2024-01-17 VITALS
DIASTOLIC BLOOD PRESSURE: 86 MMHG | WEIGHT: 154.88 LBS | TEMPERATURE: 98 F | BODY MASS INDEX: 22.94 KG/M2 | HEIGHT: 69.02 IN | OXYGEN SATURATION: 99 % | SYSTOLIC BLOOD PRESSURE: 152 MMHG | RESPIRATION RATE: 14 BRPM | HEART RATE: 60 BPM

## 2024-01-17 VITALS
BODY MASS INDEX: 22.81 KG/M2 | OXYGEN SATURATION: 98 % | WEIGHT: 154 LBS | HEIGHT: 69 IN | HEART RATE: 68 BPM | TEMPERATURE: 98 F | SYSTOLIC BLOOD PRESSURE: 112 MMHG | RESPIRATION RATE: 14 BRPM | DIASTOLIC BLOOD PRESSURE: 88 MMHG

## 2024-01-17 DIAGNOSIS — C92.10 CML (CHRONIC MYELOID LEUKEMIA) (HCC): Primary | ICD-10-CM

## 2024-01-17 DIAGNOSIS — Z00.00 ROUTINE GENERAL MEDICAL EXAMINATION AT A HEALTH CARE FACILITY: ICD-10-CM

## 2024-01-17 DIAGNOSIS — Z00.00 ROUTINE GENERAL MEDICAL EXAMINATION AT A HEALTH CARE FACILITY: Primary | ICD-10-CM

## 2024-01-17 LAB
ALBUMIN SERPL-MCNC: 4 G/DL (ref 3.4–5)
ALBUMIN/GLOB SERPL: 1.2 {RATIO} (ref 1–2)
ALP LIVER SERPL-CCNC: 81 U/L
ALT SERPL-CCNC: 48 U/L
ANION GAP SERPL CALC-SCNC: 4 MMOL/L (ref 0–18)
AST SERPL-CCNC: 29 U/L (ref 15–37)
BASOPHILS # BLD AUTO: 0.03 X10(3) UL (ref 0–0.2)
BASOPHILS NFR BLD AUTO: 0.6 %
BILIRUB SERPL-MCNC: 0.5 MG/DL (ref 0.1–2)
BUN BLD-MCNC: 20 MG/DL (ref 9–23)
CALCIUM BLD-MCNC: 9 MG/DL (ref 8.5–10.1)
CHLORIDE SERPL-SCNC: 106 MMOL/L (ref 98–112)
CHOLEST SERPL-MCNC: 199 MG/DL (ref ?–200)
CO2 SERPL-SCNC: 27 MMOL/L (ref 21–32)
COMPLEXED PSA SERPL-MCNC: 0.5 NG/ML (ref ?–4)
CREAT BLD-MCNC: 0.9 MG/DL
EGFRCR SERPLBLD CKD-EPI 2021: 105 ML/MIN/1.73M2 (ref 60–?)
EOSINOPHIL # BLD AUTO: 0.06 X10(3) UL (ref 0–0.7)
EOSINOPHIL NFR BLD AUTO: 1.3 %
ERYTHROCYTE [DISTWIDTH] IN BLOOD BY AUTOMATED COUNT: 12.1 %
GLOBULIN PLAS-MCNC: 3.4 G/DL (ref 2.8–4.4)
GLUCOSE BLD-MCNC: 95 MG/DL (ref 70–99)
HCT VFR BLD AUTO: 44.9 %
HDLC SERPL-MCNC: 81 MG/DL (ref 40–59)
HGB BLD-MCNC: 15.9 G/DL
IMM GRANULOCYTES # BLD AUTO: 0.02 X10(3) UL (ref 0–1)
IMM GRANULOCYTES NFR BLD: 0.4 %
LDLC SERPL CALC-MCNC: 99 MG/DL (ref ?–100)
LYMPHOCYTES # BLD AUTO: 1.21 X10(3) UL (ref 1–4)
LYMPHOCYTES NFR BLD AUTO: 25.2 %
MCH RBC QN AUTO: 31.3 PG (ref 26–34)
MCHC RBC AUTO-ENTMCNC: 35.4 G/DL (ref 31–37)
MCV RBC AUTO: 88.4 FL
MONOCYTES # BLD AUTO: 0.44 X10(3) UL (ref 0.1–1)
MONOCYTES NFR BLD AUTO: 9.2 %
NEUTROPHILS # BLD AUTO: 3.04 X10 (3) UL (ref 1.5–7.7)
NEUTROPHILS # BLD AUTO: 3.04 X10(3) UL (ref 1.5–7.7)
NEUTROPHILS NFR BLD AUTO: 63.3 %
NONHDLC SERPL-MCNC: 118 MG/DL (ref ?–130)
OSMOLALITY SERPL CALC.SUM OF ELEC: 286 MOSM/KG (ref 275–295)
PLATELET # BLD AUTO: 257 10(3)UL (ref 150–450)
POTASSIUM SERPL-SCNC: 4.3 MMOL/L (ref 3.5–5.1)
PROT SERPL-MCNC: 7.4 G/DL (ref 6.4–8.2)
RBC # BLD AUTO: 5.08 X10(6)UL
SODIUM SERPL-SCNC: 137 MMOL/L (ref 136–145)
TRIGL SERPL-MCNC: 108 MG/DL (ref 30–149)
VLDLC SERPL CALC-MCNC: 18 MG/DL (ref 0–30)
WBC # BLD AUTO: 4.8 X10(3) UL (ref 4–11)

## 2024-01-17 PROCEDURE — 99213 OFFICE O/P EST LOW 20 MIN: CPT | Performed by: SPECIALIST

## 2024-01-17 PROCEDURE — 3074F SYST BP LT 130 MM HG: CPT | Performed by: FAMILY MEDICINE

## 2024-01-17 PROCEDURE — 3079F DIAST BP 80-89 MM HG: CPT | Performed by: FAMILY MEDICINE

## 2024-01-17 PROCEDURE — 3077F SYST BP >= 140 MM HG: CPT | Performed by: SPECIALIST

## 2024-01-17 PROCEDURE — 99396 PREV VISIT EST AGE 40-64: CPT | Performed by: FAMILY MEDICINE

## 2024-01-17 PROCEDURE — 3008F BODY MASS INDEX DOCD: CPT | Performed by: FAMILY MEDICINE

## 2024-01-17 PROCEDURE — 3079F DIAST BP 80-89 MM HG: CPT | Performed by: SPECIALIST

## 2024-01-17 PROCEDURE — 3008F BODY MASS INDEX DOCD: CPT | Performed by: SPECIALIST

## 2024-01-17 NOTE — PROGRESS NOTES
[place] [facility] Progress Note      Patient Name: Benny Acosta   YOB: 1975  Medical Record Number: [id number]  Attending Physician: Geovani Brown M.D.     The 21st Century Cures Act makes medical notes like these available to patients in the interest of transparency. Please be advised this is a medical document. Medical documents are intended to carry relevant information, facts as evident, and the clinical opinion of the practitioner. The medical note is intended as peer to peer communication and may appear blunt or direct. It is written in medical language and may contain abbreviations or verbiage that are unfamiliar.     Date of Visit: 1/17/2024       Chief Complaint  Chronic myeloid leukemia - follow up.    Oncologic History  Benny Acosta is a 48 year old male who presented to emergency room on 01/02/2014 with complaints of substernal chest discomfort.  A complete blood count revealed a total white blood cell count of 133 K/mcl.  Quantitative PCR for BCR/ABL showed a ratio of 0.16966.  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    At diagnosis patient did not have an enlarged spleen.  His platelet count was normal and there were no basophils or blasts in the peripheral blood.  His Minor score was low at 0.55.  While awaiting confirmation of the diagnosis, he was started on hydroxyruea.    In mid 01/2014 he began nilotinib.  EKG performed one week after starting the medication as compared to the EKG performed prior to the start of medication showed no QTc prolongation.    Patient was evaluated by Dr. Lynn at the [facility] and the decision was made not to pursue stem cell transplant or even HLA  typing of the patient/siblings at this time.    Bone marrow biopsy on 07/09/2014 showed a complete cytogenetic response and quantitative PCR for BCR/ABL on the same day showed an MR3 major molecular response. In summer 2015 patient discontinued the medication as he and his wife were trying for a child. He restarted in 11/2015.     Patient discontinued therapy in mid 2017 in the face of prolonged MMR.    Routine follow up laboratory studies on 04/25/2022 showed a detectable BCR-ABL1 IS of 0.0086.    History of Present Illness  Patient returns for follow. Patient has no complaints. He denies any fevers, night sweats, weight loss. He denies any abdominal pain. He continues regular follow up with dermatology.     Performance Status   Karnofsky 100% - Normal, no complaints.    Past Medical History (historical data, reviewed by physician)  CML (as above); melanoma.     Past Surgical History (historical data, reviewed by physician)  Spine surgery; vasectomy; wide excision and sentinel lymph node biopsy for melanoma.     Family History (historical data, reviewed by physician)  No known family history of hematologic malignancy.  Patient has one brother and one sister, neither lives in [place].    Social History (historical data, reviewed by physician)  Denies tobacco and illicit drug use; uses alcohol socially.      Current Medications    Multiple Vitamin (ONE-DAILY MULTI VITAMINS) Oral Tab Take 1 tablet by mouth daily. With Zinc and B12       Allergies   Mr. Acosta has No Known Allergies.      Vital Signs   /86 (BP Location: Left arm, Patient Position: Sitting, Cuff Size: adult)   Pulse 60   Temp 97.7 °F (36.5 °C) (Tympanic)   Resp 14   Ht 1.753 m (5' 9.02\")   Wt 70.3 kg (154 lb 14.4 oz)   SpO2 99%   BMI 22.86 kg/m²     Physical Examination   Constitutional  Well developed and well nourished; in no apparent distress; appears close to chronological age.  Head   Normocephalic and atraumatic.  Eyes   Conjunctiva  clear; sclera anicteric.  ENMT   External nose normal; external ears normal.    Neck   Supple; no masses.   Hematologic/Lymphatic No petechiae or purpura. No cervical, supraclavicular, axillary adenopathy.   Neck   Supple, no masses.  Respiratory  Normal effort; no respiratory distress.  Clear to auscultation bilaterally.  Cardiovascular  Regular rate and rhythm.   Abdomen  Soft, non-tender, non-distended, normoactive bowel sounds.  No hepatosplenomegaly.  Extremities  No lower extremity edema.  Neurologic  Motor and sensory grossly intact.  Psychiatric  Mood and affect appropriate.    Laboratory   Recent Results (from the past 168 hour(s))   COMP METABOLIC PANEL [E]    Collection Time: 01/17/24  9:22 AM   Result Value Ref Range    Glucose 95 70 - 99 mg/dL    Sodium 137 136 - 145 mmol/L    Potassium 4.3 3.5 - 5.1 mmol/L    Chloride 106 98 - 112 mmol/L    CO2 27.0 21.0 - 32.0 mmol/L    Anion Gap 4 0 - 18 mmol/L    BUN 20 9 - 23 mg/dL    Creatinine 0.90 0.70 - 1.30 mg/dL    Calcium, Total 9.0 8.5 - 10.1 mg/dL    Calculated Osmolality 286 275 - 295 mOsm/kg    eGFR-Cr 105 >=60 mL/min/1.73m2    AST 29 15 - 37 U/L    ALT 48 16 - 61 U/L    Alkaline Phosphatase 81 45 - 117 U/L    Bilirubin, Total 0.5 0.1 - 2.0 mg/dL    Total Protein 7.4 6.4 - 8.2 g/dL    Albumin 4.0 3.4 - 5.0 g/dL    Globulin  3.4 2.8 - 4.4 g/dL    A/G Ratio 1.2 1.0 - 2.0    Patient Fasting for CMP? Patient not present    CBC W/ DIFFERENTIAL    Collection Time: 01/17/24  9:22 AM   Result Value Ref Range    WBC 4.8 4.0 - 11.0 x10(3) uL    RBC 5.08 4.30 - 5.70 x10(6)uL    HGB 15.9 13.0 - 17.5 g/dL    HCT 44.9 39.0 - 53.0 %    .0 150.0 - 450.0 10(3)uL    MCV 88.4 80.0 - 100.0 fL    MCH 31.3 26.0 - 34.0 pg    MCHC 35.4 31.0 - 37.0 g/dL    RDW 12.1 %    Neutrophil Absolute Prelim 3.04 1.50 - 7.70 x10 (3) uL    Neutrophil Absolute 3.04 1.50 - 7.70 x10(3) uL    Lymphocyte Absolute 1.21 1.00 - 4.00 x10(3) uL    Monocyte Absolute 0.44 0.10 - 1.00 x10(3) uL     Eosinophil Absolute 0.06 0.00 - 0.70 x10(3) uL    Basophil Absolute 0.03 0.00 - 0.20 x10(3) uL    Immature Granulocyte Absolute 0.02 0.00 - 1.00 x10(3) uL    Neutrophil % 63.3 %    Lymphocyte % 25.2 %    Monocyte % 9.2 %    Eosinophil % 1.3 %    Basophil % 0.6 %    Immature Granulocyte % 0.4 %     Impression and Plan   1.   CML: Patient has chronic phase disease with a low Minor score at diagnosis. Patient remains off therapy. Quantitative PCR for BCR/ABL is pending. If it remains undetectable, patient will return for follow up in 6 months.    Planned Follow Up   Patient will be contacted with his laboratory results; he will return for follow up in 6 months.    Electronically signed by:    Geovani Brown M.D.  System Medical Director of Oncology Services  [facility]

## 2024-01-17 NOTE — PROGRESS NOTES
Patient is here for 6 month follow up for CML. Patient is feeling well. Denies pain. No fevers, night sweats or chills. No changes since last office visit.       Education Record    Learner:  Patient    Disease / Diagnosis:  CML    Barriers / Limitations:  None   Comments:    Method:  Discussion   Comments:    General Topics:  Plan of care reviewed   Comments:    Outcome:  Shows understanding   Comments:

## 2024-01-23 LAB — INTERPRETATION: NEGATIVE

## 2024-05-10 ENCOUNTER — OFFICE VISIT (OUTPATIENT)
Dept: SURGERY | Facility: CLINIC | Age: 49
End: 2024-05-10

## 2024-05-10 VITALS
HEART RATE: 64 BPM | WEIGHT: 149 LBS | BODY MASS INDEX: 22 KG/M2 | RESPIRATION RATE: 16 BRPM | SYSTOLIC BLOOD PRESSURE: 148 MMHG | DIASTOLIC BLOOD PRESSURE: 89 MMHG | OXYGEN SATURATION: 99 %

## 2024-05-10 DIAGNOSIS — C43.59 MALIGNANT MELANOMA OF BACK (HCC): Primary | ICD-10-CM

## 2024-05-10 PROCEDURE — 99215 OFFICE O/P EST HI 40 MIN: CPT | Performed by: SURGERY

## 2024-05-10 PROCEDURE — 3077F SYST BP >= 140 MM HG: CPT | Performed by: SURGERY

## 2024-05-10 PROCEDURE — 3079F DIAST BP 80-89 MM HG: CPT | Performed by: SURGERY

## 2024-05-10 NOTE — PROGRESS NOTES
[facility]    Patient Name:  Benny Acosta   YOB: 1975   Gender:  Male   Appt Date:  5/19/2023   Provider:  Noel Cormier MD   Insurance:  [id number]     PATIENT PROVIDERS  Referring Provider: Dr. Cross    Primary Care Provider:Lamin Corona MD   Address: [address] [place] [id number]   Phone #: [phone]       CHIEF COMPLAINT  Chief Complaint   Patient presents with    Follow - Up   Melanoma left back     PROBLEMS  Reviewed   Patient Active Problem List   Diagnosis    CML (chronic myeloid leukemia) (HCC)    Acute lymphangitis of left axilla    Open wound of finger with complication    Pain of right lower extremity    Myalgia    Numerous moles    Lumbar foraminal stenosis    Right leg pain    DDD (degenerative disc disease), lumbar    Bulge of lumbar disc without myelopathy    Lumbar radiculitis    Sterilization    Elevated blood pressure reading    Mass of toe    Malignant melanoma of back (HCC)    Rosacea        History of Present Illness:  Benny Acosta  Presents for a follow up. This is a 48 year old year old Male with PMH CML 2014, now in remission, followed by Dr. Brown, who was found to have T2a melanoma left back. He was taken to the operating room on 4/22/2021 for a wide excision melanoma left upper back and sentinel lymph node biopsy. Pathology returned to show excision specimen with two separate foci of invasive melanoma (Breslow depth 0.7 mm and 0.18 mm). There was another small focus of melanoma in situ adjacent to invasive melanoma, seen 6 mm from closest 9-12 o'clock margin. All margins negative. [place] lymph nodes (2) negative for metastatic melanoma. Final stage pT2aN0  Patient returns today for follow-up.    He is doing well.  He notes no new skin lesions, no new lumps or bumps.  He otherwise feels well.     Vital Signs:  /89 (BP Location: Right arm, Patient Position: Sitting, Cuff  Size: adult)   Pulse 64   Resp 16   Wt 67.6 kg (149 lb)   SpO2 99%   BMI 22.00 kg/m²      Medications Reviewed:    Current Outpatient Medications:     Multiple Vitamin (ONE-DAILY MULTI VITAMINS) Oral Tab, Take 1 tablet by mouth daily. With Zinc and B12, Disp: , Rfl:      Allergies Reviewed:  No Known Allergies     History:  Reviewed:  Past Medical History:    Back problem    Myeloid leukemia (HCC)    2014      Reviewed:  Past Surgical History:   Procedure Laterality Date    Other surgical history  03/06/2018    lumbar spinal fusion    Spine surgery procedure unlisted      lumbar fusion 2018      Reviewed Social History:  Social History     Socioeconomic History    Marital status:    Tobacco Use    Smoking status: Never    Smokeless tobacco: Never   Vaping Use    Vaping status: Never Used   Substance and Sexual Activity    Alcohol use: Yes     Alcohol/week: 0.0 standard drinks of alcohol     Comment: socially    Drug use: No   Other Topics Concern    Caffeine Concern Yes     Comment: 1 cup of coffee daily    Exercise Yes     Comment: everyday      Reviewed:  Family History   Problem Relation Age of Onset    No Known Problems Father     No Known Problems Mother         Review of Systems:  Review of Systems   Constitutional:  Negative for chills, fatigue and fever.   Gastrointestinal:  Negative for abdominal distention, abdominal pain, constipation, diarrhea, nausea and vomiting.   Genitourinary:  Negative for dysuria and hematuria.   Skin:  Negative for rash and wound.        Physical Examination:  Physical Exam  Constitutional:       General: He is not in acute distress.     Appearance: He is well-developed.   Pulmonary:      Effort: Pulmonary effort is normal.   Abdominal:      General: There is no distension.      Palpations: Abdomen is soft. There is no mass.      Tenderness: There is no abdominal tenderness.   Musculoskeletal:         General: Normal range of motion.   Lymphadenopathy:      Cervical:  No cervical adenopathy.      Upper Body:      Right upper body: No supraclavicular or axillary adenopathy.      Left upper body: No supraclavicular or axillary adenopathy.      Lower Body: No right inguinal adenopathy. No left inguinal adenopathy.   Skin:     General: Skin is warm and dry.              Document Review:  PATHOLOGY 4/22/2021:  Final Diagnosis:      A. [place] lymph node #1; dissection:   Single lymph node, negative for melanoma (immunohistochemical stains for SOX-10 and Melan-A (appropriate control reactivity) are negative and support the above diagnosis.     B. [place] lymph node #2; dissection:   Single small lymph node, negative for melanoma (immunohistochemical stains for SOX-10 and Melan-A (appropriate control reactivity) are negative and support the above diagnosis.     C. Skin, back; wide local excision:   History of invasive malignant melanoma (Breslow's depth - 1.8 mm; at least Rudy level IV) ([id number]).   Current specimen with two separate foci of invasive malignant melanoma (Breslow depth 0.7 mm and 0.18 mm,  Rudy's level III and II) (see comment).   Small focus of melanoma in situ adjacent to invasive melanoma, seen at a distance of 6 mm from the closest 9-12 o'clock margin.  Invasive melanoma is seen at a distance of approximately 7 mm from 9-12 o'clock and 12-3 o'clock margins.  Previous biopsy site changes.  All peripheral and deep margins are free of melanoma.  Preliminary pathologic staging pT2a, N0     Comment:  The updated staging summary also includes findings from previous shave biopsy ([id number]).     Immunohistochemical stain for Melan-A performed on blocks CD13, CD14, CD15, C16 and C17 are negative for dermal melanocytes.  Melan-A performed on block C11and C18 highlight nests of melanocytes in the dermis and confirmsthe diagnosis of invasive melanoma.     The current specimen shows 2 separate foci of invasive malignant melanoma away from the previous biopsy site (Breslow  depth 0.7 mm and 0.18 mm,  Rudy's level III and II)  in blocks C 11 and C 18 respectively.  Clinical correlation with close clinical follow-up is recommended.        TUMOR   Tumor Site  Skin of trunk: left paraspinal region         Histologic Type  Nodular melanoma    Maximum Tumor (Breslow) Thickness (Millimeters)  1.8 mm   Macroscopic Satellite Nodule(s)  Not identified    Ulceration  Not identified    Anatomic (Rudy) Level  IV (Melanoma invades reticular dermis)    Mitotic Rate  1 mitoses / mm2   Microsatellite(s)  Present    Lymphovascular Invasion  Not identified    Neurotropism  Not identified    Tumor-Infiltrating Lymphocytes  Present, brisk    Tumor Regression  Not identified    MARGINS     Peripheral Margins  Negative for invasive melanoma    Distance of Invasive Melanoma from Closest Peripheral Margin (Millimeters)  7 mm   Location  9-12 o'clock, 12-3 o'clock    Status of Melanoma in situ at Peripheral Margins  Negative for melanoma in situ    Distance of Melanoma in situ from Closest Peripheral Margin (Millimeters)  6 mm   Location  9-12 o' cloock    Deep Margin  Negative for invasive melanoma    Distance of Invasive Melanoma from Deep Margin (Millimeters)  20 mm   Status of Melanoma in situ at Deep Margin  Negative for melanoma in situ    Distance of Melanoma in situ from Deep Margin (Millimeters)  21 mm   LYMPH NODES     Regional Lymph Nodes  Uninvolved by tumor cells    Total Number of Lymph Nodes Examined  2    Number of [place] Nodes Examined  2    PATHOLOGIC STAGE CLASSIFICATION (pTNM, AJCC 8th Edition)     Primary Tumor (pT)  pT2a    Regional Lymph Nodes (pN)  pN0         Assessment / Plan:  48 year old year old Male with PMH CML 2014, now in remission, followed by Dr. Brown, who presented with pT2a melanoma left upper back. He is s/p for a wide excision melanoma left upper back and sentinel lymph node biopsy on 4/22/2021. Pathology returned to show excision specimen with two separate foci of  invasive melanoma (Breslow depth 0.7 mm and 0.18 mm). There was another small focus of melanoma in situ adjacent to invasive melanoma, seen 6 mm from closest 9-12 o'clock margin. All margins negative. [place] lymph nodes (2) negative for metastatic melanoma.     Remains MENDEL on exam  Follow up with us in 12 months for physical exam  Encouraged continued dermatologic surveillance.    Noel Cormier MD  [facility]  [facility]  Pager [phone]  [email or web address]        Follow Up:  No follow-ups on file.

## 2024-05-29 ENCOUNTER — TELEPHONE (OUTPATIENT)
Dept: FAMILY MEDICINE CLINIC | Facility: CLINIC | Age: 49
End: 2024-05-29

## 2024-05-29 DIAGNOSIS — L81.4 OTHER MELANIN HYPERPIGMENTATION: Primary | ICD-10-CM

## 2024-05-29 NOTE — TELEPHONE ENCOUNTER
Received letter from [facility], Dr. Ugarte is out of network for derm.     New referral placed for Dr. KAREY Grajeda.     See nurse bin.     Please inform patient. .

## 2024-07-16 ENCOUNTER — TELEPHONE (OUTPATIENT)
Dept: HEMATOLOGY/ONCOLOGY | Facility: HOSPITAL | Age: 49
End: 2024-07-16

## 2024-07-24 ENCOUNTER — APPOINTMENT (OUTPATIENT)
Dept: HEMATOLOGY/ONCOLOGY | Age: 49
End: 2024-07-24
Attending: SPECIALIST
Payer: COMMERCIAL

## 2024-07-31 ENCOUNTER — OFFICE VISIT (OUTPATIENT)
Dept: FAMILY MEDICINE CLINIC | Facility: CLINIC | Age: 49
End: 2024-07-31
Payer: COMMERCIAL

## 2024-07-31 ENCOUNTER — HOSPITAL ENCOUNTER (OUTPATIENT)
Dept: GENERAL RADIOLOGY | Age: 49
Discharge: HOME | End: 2024-07-31
Attending: FAMILY MEDICINE
Payer: COMMERCIAL

## 2024-07-31 VITALS
TEMPERATURE: 98 F | BODY MASS INDEX: 22.51 KG/M2 | DIASTOLIC BLOOD PRESSURE: 92 MMHG | SYSTOLIC BLOOD PRESSURE: 142 MMHG | HEART RATE: 57 BPM | HEIGHT: 69 IN | OXYGEN SATURATION: 99 % | RESPIRATION RATE: 14 BRPM | WEIGHT: 152 LBS

## 2024-07-31 DIAGNOSIS — S69.91XA INJURY OF FINGER OF RIGHT HAND, INITIAL ENCOUNTER: Primary | ICD-10-CM

## 2024-07-31 DIAGNOSIS — S69.91XA INJURY OF FINGER OF RIGHT HAND, INITIAL ENCOUNTER: ICD-10-CM

## 2024-07-31 DIAGNOSIS — M77.12 LATERAL EPICONDYLITIS OF LEFT ELBOW: ICD-10-CM

## 2024-07-31 PROCEDURE — 99214 OFFICE O/P EST MOD 30 MIN: CPT | Performed by: FAMILY MEDICINE

## 2024-07-31 PROCEDURE — 3077F SYST BP >= 140 MM HG: CPT | Performed by: FAMILY MEDICINE

## 2024-07-31 PROCEDURE — 3080F DIAST BP >= 90 MM HG: CPT | Performed by: FAMILY MEDICINE

## 2024-07-31 PROCEDURE — 73140 X-RAY EXAM OF FINGER(S): CPT | Performed by: FAMILY MEDICINE

## 2024-07-31 PROCEDURE — 3008F BODY MASS INDEX DOCD: CPT | Performed by: FAMILY MEDICINE

## 2024-07-31 NOTE — PROGRESS NOTES
HPI:   Benny Acosta is a 48 year old male that presents for right hand index finger injury in May 2024 while playing with his daughter.  He is not sure if he twisted the finger but he has had some pain specially when he  things with the right hand.  Also states that having some pain over the left outer elbow area over the past 1 month.  Denies any trauma to that area.  He does have some pain in the proximal forearm region.      Past medical, surgical, family and social history reviewed in detail with patient and updated where appropriate.      Patient Active Problem List    Diagnosis    Rosacea    Malignant melanoma of back (HCC)    Elevated blood pressure reading    Mass of toe    Sterilization    DDD (degenerative disc disease), lumbar    Bulge of lumbar disc without myelopathy    Lumbar radiculitis    Lumbar foraminal stenosis    Right leg pain    Pain of right lower extremity    Myalgia    Numerous moles    Open wound of finger with complication    Acute lymphangitis of left axilla    CML (chronic myeloid leukemia) (HCC)     Formatting of this note might be different from the original.  01/02/2014: presented with chest pain. . PCR for BCR-ABL 0.24 and 32% on IS scale. BM reportedly showed CP. No PB blasts or splenomegaly. Cytogenetics showed t(9;22;10)(q34;q11.2;p11.2)  Hydrea followed by nilotinib 300 mg po BID. Baseline EKG showed QTc of 413.           Current Outpatient Medications:     Multiple Vitamin (ONE-DAILY MULTI VITAMINS) Oral Tab, Take 1 tablet by mouth daily. With Zinc and B12, Disp: , Rfl:     REVIEW OF SYSTEMS:     Comprehensive ROS negative except where noted in HPI    PHYSICAL EXAM:   BP (!) 142/92   Pulse 57   Temp 98.4 °F (36.9 °C) (Temporal)   Resp 14   Ht 5' 9\" (1.753 m)   Wt 152 lb (68.9 kg)   SpO2 99%   BMI 22.45 kg/m²  Estimated body mass index is 22.45 kg/m² as calculated from the following:    Height as of this encounter: 5' 9\" (1.753 m).    Weight as of this  encounter: 152 lb (68.9 kg).   Vital signs reviewed.Appears stated age, well groomed.  Physical Exam:  GEN:  patient is alert, awake and oriented, well developed, well nourished, no apparent distress.  HEART: S1 and S2, regular rate and rhythm, no murmurs, gallops, or rubs.  LUNGS: clear to auscultation bilterally, no rales, rhonchi, or wheezing.      ASSESSMENT AND PLAN:      1. Injury of finger of right hand, initial encounter  - XR FINGER(S) (MIN 2 VIEWS), RIGHT 2ND (CPT=73140); Future    2. Lateral epicondylitis of left elbow    Regarding the lateral epicondylitis recommend forearm strap on the forearm he does have 1 but was using it near the elbow.  Anti-inflammatory as needed    For the finger injury will check x-ray of the finger to evaluate for fracture and he does have pain near the MCP joint.  No swelling or deformity of the MCP joint noted full range of motion of finger and hand he should use ibuprofen for this for 100 mg twice a day for 5 days.  If there is any fracture will refer to orthopedic hand surgeon.    Risks, benefits, and alternatives of current treatment plan discussed in detail.  Questions and concerns addressed. Red flags to RTC or ED reviewed.  Patient (or parent) agrees to plan.      No follow-ups on file.    Lamin Corona M.D.  Family Medicine   7/31/2024  10:35 AM    Spent a total of 30 minutes obtaining history evaluating patient, reviewing medical chart, discussing treatment options and completing documentation.

## 2024-08-01 DIAGNOSIS — S62.640A CLOSED NONDISPLACED FRACTURE OF PROXIMAL PHALANX OF RIGHT INDEX FINGER, INITIAL ENCOUNTER: Primary | ICD-10-CM

## 2024-08-08 ENCOUNTER — TELEPHONE (OUTPATIENT)
Dept: HEMATOLOGY/ONCOLOGY | Facility: HOSPITAL | Age: 49
End: 2024-08-08

## 2024-08-11 ENCOUNTER — PATIENT MESSAGE (OUTPATIENT)
Dept: FAMILY MEDICINE CLINIC | Facility: CLINIC | Age: 49
End: 2024-08-11

## 2024-08-11 DIAGNOSIS — C92.10 CML (CHRONIC MYELOID LEUKEMIA) (HCC): Primary | ICD-10-CM

## 2024-08-12 ENCOUNTER — PATIENT MESSAGE (OUTPATIENT)
Dept: FAMILY MEDICINE CLINIC | Facility: CLINIC | Age: 49
End: 2024-08-12

## 2024-08-12 NOTE — TELEPHONE ENCOUNTER
From: Benny Acosta  To: Lamin Corona  Sent: 8/11/2024 8:04 AM CDT  Subject: Need a Referral     Hello, I need to please have an updated referral sent to the staff for [facility]

## 2024-08-14 ENCOUNTER — OFFICE VISIT (OUTPATIENT)
Dept: HEMATOLOGY/ONCOLOGY | Age: 49
End: 2024-08-14
Attending: SPECIALIST
Payer: COMMERCIAL

## 2024-08-14 DIAGNOSIS — C92.10 CML (CHRONIC MYELOID LEUKEMIA) (HCC): Primary | ICD-10-CM

## 2024-08-14 NOTE — TELEPHONE ENCOUNTER
From: Benny Acosta  To: Lamin Corona  Sent: 8/12/2024 4:04 PM CDT  Subject: A couple things    Hi doctor hope you're having a great week! I need an updated referral to see Dr Wayne adams from oncology and also the tennis elbow does not seem to be improving. What could we do as next steps to help move this thing along so I can get back to the [employer] and working out I enjoy as I can't do those with it in its current state?

## 2024-08-15 RX ORDER — METHYLPREDNISOLONE 4 MG/1
TABLET ORAL
Qty: 21 TABLET | Refills: 0 | Status: SHIPPED | OUTPATIENT
Start: 2024-08-15

## 2024-08-19 ENCOUNTER — OFFICE VISIT (OUTPATIENT)
Dept: ORTHOPEDICS CLINIC | Facility: CLINIC | Age: 49
End: 2024-08-19
Payer: COMMERCIAL

## 2024-08-19 VITALS — BODY MASS INDEX: 22.51 KG/M2 | WEIGHT: 152 LBS | HEIGHT: 69 IN

## 2024-08-19 DIAGNOSIS — M77.12 LATERAL EPICONDYLITIS OF LEFT ELBOW: Primary | ICD-10-CM

## 2024-08-19 PROCEDURE — 3008F BODY MASS INDEX DOCD: CPT | Performed by: PHYSICIAN ASSISTANT

## 2024-08-19 PROCEDURE — 99203 OFFICE O/P NEW LOW 30 MIN: CPT | Performed by: PHYSICIAN ASSISTANT

## 2024-08-19 RX ORDER — MELOXICAM 15 MG/1
15 TABLET ORAL DAILY
Qty: 30 TABLET | Refills: 0 | Status: SHIPPED | OUTPATIENT
Start: 2024-08-19

## 2024-08-19 NOTE — H&P
Clinic Note [facility]     Assessment/Plan:  49 year old male    Left elbow lateral epicondylitis-we discussed trying a wrist brace and a course of meloxicam to help with some of his symptoms.  If this persist we discussed either therapy or cortisone injection.  Right index finger MCP joint RCL sprain-I would like him to try kaylie taping for the next 4 to 6 weeks I do think he will continue to improve if he does not may recommend either trying a proper splint or getting an MRI or possible cortisone injection.  All of his questions were answered.      ICD-10-CM    1. Lateral epicondylitis of left elbow  M77.12            Follow Up: 5 to 6 weeks we can cancel if better    Diagnostic Studies:  Small possible avulsion fracture over the MCP joint of the index finger on the radial aspect    Physical Exam:    Ht 5' 9\" (1.753 m)   Wt 152 lb (68.9 kg)   BMI 22.45 kg/m²     Constitutional: NAD. AOx3. Well-developed and Well-nourished.   Psychiatric: Normal mood/ affect/ behavior. Judgment and thought content normal.     Bilateral upper Extremity:   Inspection: Skin Intact. No skin lesions. No gross deformity.   Palpation: Tender over the right index finger MCP joint RCL and pain with stress testing but no laxity.  Pain over the left elbow lateral epicondyle and pain with resisted wrist extension.   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  He also has left elbow pain ongoing about a month worse with gripping grasping especially grabbing a  coffee cup.    @[email or web address]@      Past Medical History:    Back problem    Myeloid leukemia (HCC)    2014       Past Surgical History:   Procedure Laterality Date    Other surgical history  03/06/2018    lumbar spinal fusion    Spine surgery procedure unlisted      lumbar fusion 2018       Current Outpatient Medications   Medication Sig Dispense Refill    Meloxicam 15 MG Oral Tab Take 1 tablet (15 mg total) by mouth daily. Take once daily for 4 weeks 30 tablet 0    methylPREDNISolone (MEDROL) 4 MG Oral Tablet Therapy Pack As directed. 21 tablet 0    Multiple Vitamin (ONE-DAILY MULTI VITAMINS) Oral Tab Take 1 tablet by mouth daily. With Zinc and B12         No Known Allergies    Family History   Problem Relation Age of Onset    No Known Problems Father     No Known Problems Mother        Social History     Occupational History    Not on file   Tobacco Use    Smoking status: Never    Smokeless tobacco: Never   Vaping Use    Vaping status: Never Used   Substance and Sexual Activity    Alcohol use: Yes     Alcohol/week: 0.0 standard drinks of alcohol     Comment: socially    Drug use: No    Sexual activity: Not on file        Review of Systems (negative unless bolded):  General: fevers, chills, fatigue  CV:  chest pain, palpitations, leg swelling  Msk: bodyaches, neck pain, neck stiffness  Skin: rashes, open wounds, nonhealing ulcers  Hem: bleeds easily, bruise easily, immunocompromised  Neuro: dizziness, light headedness, headaches  Psych: anxious, depressed, anger issues  Lavonne Jacobsen PA-C  Hand, Wrist, & Elbow Surgery  Physician Assistant to Dr. Дмитрий [email or web address]  t: [phone]  f: [phone]

## 2024-08-26 ENCOUNTER — DOCUMENTATION ONLY (OUTPATIENT)
Dept: SURGERY | Facility: CLINIC | Age: 49
End: 2024-08-26

## 2024-08-26 NOTE — PROGRESS NOTES
Office notes for DOS: 5/10/24 faxed to patient's dermatologist for continuation of care. Fax confirmation received.     Dr. Juan Cross  (P) [phone]  (F) [phone]

## 2024-09-08 NOTE — PROGRESS NOTES
[facility] Progress Note      Patient Name: Benny Acosta   YOB: 1975  Medical Record Number: [id number]  Attending Physician: Geovani Brown M.D.     The 21st Century Cures Act makes medical notes like these available to patients in the interest of transparency. Please be advised this is a medical document. Medical documents are intended to carry relevant information, facts as evident, and the clinical opinion of the practitioner. The medical note is intended as peer to peer communication and may appear blunt or direct. It is written in medical language and may contain abbreviations or verbiage that are unfamiliar.     Date of Visit: 9/9/2024       Chief Complaint  Chronic myeloid leukemia - follow up.    Oncologic History  Benny Acosta is a 49 year old male who presented to emergency room on 01/02/2014 with complaints of substernal chest discomfort.  A complete blood count revealed a total white blood cell count of 133 K/mcl.  Quantitative PCR for BCR/ABL showed a ratio of 0.04210.  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    At diagnosis patient did not have an enlarged spleen.  His platelet count was normal and there were no basophils or blasts in the peripheral blood.  His Minor score was low at 0.55.  While awaiting confirmation of the diagnosis, he was started on hydroxyruea.    In mid 01/2014 he began nilotinib.  EKG performed one week after starting the medication as compared to the EKG performed prior to the start of medication showed no QTc prolongation.    Patient was evaluated by Dr. Lynn at the [facility] and the decision was made not to pursue stem cell transplant or even HLA  typing of the patient/siblings at this time.    Bone marrow biopsy on 07/09/2014 showed a complete cytogenetic response and quantitative PCR for BCR/ABL on the same day showed an MR3 major molecular response. In summer 2015 patient discontinued the medication as he and his wife were trying for a child. He restarted in 11/2015.     Patient discontinued therapy in mid 2017 in the face of prolonged MMR.    Routine follow up laboratory studies on 04/25/2022 showed a detectable BCR-ABL1 IS of 0.0086. The following values in 07/2022 was undetectable.     History of Present Illness  Patient returns for follow. Patient has no complaints. He denies any fevers, night sweats, weight loss. He denies any abdominal pain. He continues regular follow up with dermatology.     Performance Status   Karnofsky 100% - Normal, no complaints.    Past Medical History (historical data, reviewed by physician)  CML (as above); melanoma.     Past Surgical History (historical data, reviewed by physician)  Spine surgery; vasectomy; wide excision and sentinel lymph node biopsy for melanoma.     Family History (historical data, reviewed by physician)  No known family history of hematologic malignancy.  Patient has one brother and one sister, neither lives in [place].    Social History (historical data, reviewed by physician)  Denies tobacco and illicit drug use; uses alcohol socially.      Current Medications    Multiple Vitamin (ONE-DAILY MULTI VITAMINS) Oral Tab Take 1 tablet by mouth daily. With Zinc and B12       Allergies   Mr. Acosta has No Known Allergies.      Vital Signs   BP (!) 146/95 (BP Location: Right arm, Patient Position: Sitting, Cuff Size: adult)   Pulse 64   Temp 97.9 °F (36.6 °C) (Temporal)   Ht 1.753 m (5' 9.02\")   Wt 69 kg (152 lb 3.2 oz)   SpO2 98%   BMI 22.47 kg/m²     Physical Examination   Constitutional  Well developed and well nourished; in no apparent distress; appears close to chronological  age.  Head   Normocephalic and atraumatic.  Eyes   Conjunctiva clear; sclera anicteric.  ENMT   External nose normal; external ears normal.    Neck   Supple; no masses.   Hematologic/Lymphatic No petechiae or purpura. No cervical, supraclavicular, axillary adenopathy.   Neck   Supple, no masses.  Respiratory  Normal effort; no respiratory distress.  Clear to auscultation bilaterally.  Cardiovascular  Regular rate and rhythm.   Abdomen  Soft, non-tender, non-distended, normoactive bowel sounds.  No hepatosplenomegaly.  Extremities  No lower extremity edema.  Neurologic  Motor and sensory grossly intact.  Psychiatric  Mood and affect appropriate.    Laboratory   Recent Results (from the past 168 hour(s))   CBC W/DIFF [E]    Collection Time: 09/09/24 10:15 AM   Result Value Ref Range    WBC 5.3 4.0 - 11.0 x10(3) uL    RBC 4.76 4.30 - 5.70 x10(6)uL    HGB 15.3 13.0 - 17.5 g/dL    HCT 41.6 39.0 - 53.0 %    .0 150.0 - 450.0 10(3)uL    MCV 87.4 80.0 - 100.0 fL    MCH 32.1 26.0 - 34.0 pg    MCHC 36.8 31.0 - 37.0 g/dL    RDW 12.3 %    Neutrophil Absolute Prelim 3.60 1.50 - 7.70 x10 (3) uL    Neutrophil Absolute 3.60 1.50 - 7.70 x10(3) uL    Lymphocyte Absolute 1.08 1.00 - 4.00 x10(3) uL    Monocyte Absolute 0.46 0.10 - 1.00 x10(3) uL    Eosinophil Absolute 0.08 0.00 - 0.70 x10(3) uL    Basophil Absolute 0.03 0.00 - 0.20 x10(3) uL    Immature Granulocyte Absolute 0.02 0.00 - 1.00 x10(3) uL    Neutrophil % 68.3 %    Lymphocyte % 20.5 %    Monocyte % 8.7 %    Eosinophil % 1.5 %    Basophil % 0.6 %    Immature Granulocyte % 0.4 %   COMP METABOLIC PANEL [E]    Collection Time: 09/09/24 10:15 AM   Result Value Ref Range    Glucose 95 70 - 99 mg/dL    Sodium 139 136 - 145 mmol/L    Potassium 4.3 3.5 - 5.1 mmol/L    Chloride 107 98 - 112 mmol/L    CO2 26.0 21.0 - 32.0 mmol/L    Anion Gap 6 0 - 18 mmol/L    BUN 16 9 - 23 mg/dL    Creatinine 0.91 0.70 - 1.30 mg/dL    Calcium, Total 9.8 8.7 - 10.4 mg/dL    Calculated Osmolality  289 275 - 295 mOsm/kg    eGFR-Cr 103 >=60 mL/min/1.73m2    AST 27 <34 U/L    ALT 32 10 - 49 U/L    Alkaline Phosphatase 75 45 - 117 U/L    Bilirubin, Total 1.0 0.3 - 1.2 mg/dL    Total Protein 7.2 5.7 - 8.2 g/dL    Albumin 4.6 3.2 - 4.8 g/dL    Globulin  2.6 2.0 - 3.5 g/dL    A/G Ratio 1.8 1.0 - 2.0    Patient Fasting for CMP? No    CBC w/Auto Diff    Collection Time: 09/09/24 10:15 AM   Result Value Ref Range    WBC 5.5 4.0 - 11.0 x10(3) uL    RBC 4.86 4.30 - 5.70 x10(6)uL    HGB 15.2 13.0 - 17.5 g/dL    HCT 43.0 39.0 - 53.0 %    .0 150.0 - 450.0 10(3)uL    MCV 88.5 80.0 - 100.0 fL    MCH 31.3 26.0 - 34.0 pg    MCHC 35.3 31.0 - 37.0 g/dL    RDW 12.4 %    Neutrophil Absolute Prelim 3.74 1.50 - 7.70 x10 (3) uL    Neutrophil Absolute 3.74 1.50 - 7.70 x10(3) uL    Lymphocyte Absolute 1.14 1.00 - 4.00 x10(3) uL    Monocyte Absolute 0.48 0.10 - 1.00 x10(3) uL    Eosinophil Absolute 0.07 0.00 - 0.70 x10(3) uL    Basophil Absolute 0.03 0.00 - 0.20 x10(3) uL    Immature Granulocyte Absolute 0.02 0.00 - 1.00 x10(3) uL    Neutrophil % 68.2 %    Lymphocyte % 20.8 %    Monocyte % 8.8 %    Eosinophil % 1.3 %    Basophil % 0.5 %    Immature Granulocyte % 0.4 %     Impression and Plan   1.   CML: Patient has chronic phase disease with a low Minor score at diagnosis. Patient remains off therapy. Quantitative PCR for BCR/ABL is pending. If it remains undetectable, patient will return for follow up in 6 months.    Patient will continue to receive longitudinal care by me for the complex care required for the cancer diagnosis.    Planned Follow Up   Patient will be contacted with his laboratory results; he will return for follow up in 6 months.    Electronically signed by:    Geovani Brown M.D.  System Medical Director of Oncology Services  [facility]

## 2024-09-09 ENCOUNTER — OFFICE VISIT (OUTPATIENT)
Dept: HEMATOLOGY/ONCOLOGY | Facility: HOSPITAL | Age: 49
End: 2024-09-09
Attending: SPECIALIST
Payer: COMMERCIAL

## 2024-09-09 VITALS
WEIGHT: 152.19 LBS | BODY MASS INDEX: 22.54 KG/M2 | HEIGHT: 69.02 IN | TEMPERATURE: 98 F | DIASTOLIC BLOOD PRESSURE: 95 MMHG | HEART RATE: 64 BPM | SYSTOLIC BLOOD PRESSURE: 146 MMHG | OXYGEN SATURATION: 98 %

## 2024-09-09 DIAGNOSIS — C92.10 CML (CHRONIC MYELOID LEUKEMIA) (HCC): Primary | ICD-10-CM

## 2024-09-09 LAB
ALBUMIN SERPL-MCNC: 4.6 G/DL (ref 3.2–4.8)
ALBUMIN/GLOB SERPL: 1.8 {RATIO} (ref 1–2)
ALP LIVER SERPL-CCNC: 75 U/L
ALT SERPL-CCNC: 32 U/L
ANION GAP SERPL CALC-SCNC: 6 MMOL/L (ref 0–18)
AST SERPL-CCNC: 27 U/L (ref ?–34)
BASOPHILS # BLD AUTO: 0.03 X10(3) UL (ref 0–0.2)
BASOPHILS # BLD AUTO: 0.03 X10(3) UL (ref 0–0.2)
BASOPHILS NFR BLD AUTO: 0.5 %
BASOPHILS NFR BLD AUTO: 0.6 %
BILIRUB SERPL-MCNC: 1 MG/DL (ref 0.3–1.2)
BUN BLD-MCNC: 16 MG/DL (ref 9–23)
CALCIUM BLD-MCNC: 9.8 MG/DL (ref 8.7–10.4)
CHLORIDE SERPL-SCNC: 107 MMOL/L (ref 98–112)
CO2 SERPL-SCNC: 26 MMOL/L (ref 21–32)
CREAT BLD-MCNC: 0.91 MG/DL
EGFRCR SERPLBLD CKD-EPI 2021: 103 ML/MIN/1.73M2 (ref 60–?)
EOSINOPHIL # BLD AUTO: 0.07 X10(3) UL (ref 0–0.7)
EOSINOPHIL # BLD AUTO: 0.08 X10(3) UL (ref 0–0.7)
EOSINOPHIL NFR BLD AUTO: 1.3 %
EOSINOPHIL NFR BLD AUTO: 1.5 %
ERYTHROCYTE [DISTWIDTH] IN BLOOD BY AUTOMATED COUNT: 12.3 %
ERYTHROCYTE [DISTWIDTH] IN BLOOD BY AUTOMATED COUNT: 12.4 %
FASTING STATUS PATIENT QL REPORTED: NO
GLOBULIN PLAS-MCNC: 2.6 G/DL (ref 2–3.5)
GLUCOSE BLD-MCNC: 95 MG/DL (ref 70–99)
HCT VFR BLD AUTO: 41.6 %
HCT VFR BLD AUTO: 43 %
HGB BLD-MCNC: 15.2 G/DL
HGB BLD-MCNC: 15.3 G/DL
IMM GRANULOCYTES # BLD AUTO: 0.02 X10(3) UL (ref 0–1)
IMM GRANULOCYTES # BLD AUTO: 0.02 X10(3) UL (ref 0–1)
IMM GRANULOCYTES NFR BLD: 0.4 %
IMM GRANULOCYTES NFR BLD: 0.4 %
LYMPHOCYTES # BLD AUTO: 1.08 X10(3) UL (ref 1–4)
LYMPHOCYTES # BLD AUTO: 1.14 X10(3) UL (ref 1–4)
LYMPHOCYTES NFR BLD AUTO: 20.5 %
LYMPHOCYTES NFR BLD AUTO: 20.8 %
MCH RBC QN AUTO: 31.3 PG (ref 26–34)
MCH RBC QN AUTO: 32.1 PG (ref 26–34)
MCHC RBC AUTO-ENTMCNC: 35.3 G/DL (ref 31–37)
MCHC RBC AUTO-ENTMCNC: 36.8 G/DL (ref 31–37)
MCV RBC AUTO: 87.4 FL
MCV RBC AUTO: 88.5 FL
MONOCYTES # BLD AUTO: 0.46 X10(3) UL (ref 0.1–1)
MONOCYTES # BLD AUTO: 0.48 X10(3) UL (ref 0.1–1)
MONOCYTES NFR BLD AUTO: 8.7 %
MONOCYTES NFR BLD AUTO: 8.8 %
NEUTROPHILS # BLD AUTO: 3.6 X10 (3) UL (ref 1.5–7.7)
NEUTROPHILS # BLD AUTO: 3.6 X10(3) UL (ref 1.5–7.7)
NEUTROPHILS # BLD AUTO: 3.74 X10 (3) UL (ref 1.5–7.7)
NEUTROPHILS # BLD AUTO: 3.74 X10(3) UL (ref 1.5–7.7)
NEUTROPHILS NFR BLD AUTO: 68.2 %
NEUTROPHILS NFR BLD AUTO: 68.3 %
OSMOLALITY SERPL CALC.SUM OF ELEC: 289 MOSM/KG (ref 275–295)
PLATELET # BLD AUTO: 237 10(3)UL (ref 150–450)
PLATELET # BLD AUTO: 237 10(3)UL (ref 150–450)
POTASSIUM SERPL-SCNC: 4.3 MMOL/L (ref 3.5–5.1)
PROT SERPL-MCNC: 7.2 G/DL (ref 5.7–8.2)
RBC # BLD AUTO: 4.76 X10(6)UL
RBC # BLD AUTO: 4.86 X10(6)UL
SODIUM SERPL-SCNC: 139 MMOL/L (ref 136–145)
WBC # BLD AUTO: 5.3 X10(3) UL (ref 4–11)
WBC # BLD AUTO: 5.5 X10(3) UL (ref 4–11)

## 2024-09-09 PROCEDURE — G2211 COMPLEX E/M VISIT ADD ON: HCPCS | Performed by: SPECIALIST

## 2024-09-09 PROCEDURE — 99213 OFFICE O/P EST LOW 20 MIN: CPT | Performed by: SPECIALIST

## 2024-09-09 NOTE — PROGRESS NOTES
Patient is here for MD f/u for CML. Patient is feeling well. Denies fevers, night sweats or chills. Denies any concerns at present time.     Education Record    Learner:  Patient    Disease / Diagnosis:  CML    Barriers / Limitations:  None   Comments:    Method:  Discussion   Comments:    General Topics:  Plan of care reviewed   Comments:    Outcome:  Shows understanding   Comments:

## 2024-09-11 LAB
BCR-ABL1 INTERPRETATION: DETECTED
NS MOLECULAR RESPONSE VALUE: >4.52

## 2024-09-18 RX ORDER — MELOXICAM 15 MG/1
TABLET ORAL
Qty: 30 TABLET | Refills: 0 | Status: SHIPPED | OUTPATIENT
Start: 2024-09-18

## 2024-09-18 NOTE — TELEPHONE ENCOUNTER
Meloxicam    DOS: n/a  Last OV: 8/19/24 Lateral epicondylitis of left elbow  Last refill date: 8/19/24 #/refills: 30/0  Upcoming appt: None    Component      Latest Ref Rng 9/9/2024   Glucose      70 - 99 mg/dL 95    Sodium      136 - 145 mmol/L 139    Potassium      3.5 - 5.1 mmol/L 4.3    Chloride      98 - 112 mmol/L 107    Carbon Dioxide, Total      21.0 - 32.0 mmol/L 26.0    ANION GAP      0 - 18 mmol/L 6    BUN      9 - 23 mg/dL 16    CREATININE      0.70 - 1.30 mg/dL 0.91    CALCIUM      8.7 - 10.4 mg/dL 9.8    CALCULATED OSMOLALITY      275 - 295 mOsm/kg 289    EGFR      >=60 mL/min/1.73m2 103    AST (SGOT)      <34 U/L 27    ALT (SGPT)      10 - 49 U/L 32    ALKALINE PHOSPHATASE      45 - 117 U/L 75    Total Bilirubin      0.3 - 1.2 mg/dL 1.0    PROTEIN, TOTAL      5.7 - 8.2 g/dL 7.2    Albumin      3.2 - 4.8 g/dL 4.6    Globulin      2.0 - 3.5 g/dL 2.6    A/G Ratio      1.0 - 2.0  1.8    Patient Fasting for CMP? No

## 2024-10-10 ENCOUNTER — TELEPHONE (OUTPATIENT)
Dept: FAMILY MEDICINE CLINIC | Facility: CLINIC | Age: 49
End: 2024-10-10

## 2024-10-10 NOTE — TELEPHONE ENCOUNTER
Appointment Request From: Benny Acosta      With Provider: Lamin Corona [-Primary Care Physician-]      Preferred Date Range: Any date 10/10/2024 or later      Preferred Times: Any      Reason: To address the following health maintenance concerns.   Colorectal Cancer Screening      Comments:   Can I get a list of locations closest to my house in [place] that I can schedule this?

## 2024-11-20 ENCOUNTER — LAB REQUISITION (OUTPATIENT)
Dept: LAB | Facility: HOSPITAL | Age: 49
End: 2024-11-20
Payer: COMMERCIAL

## 2024-11-20 DIAGNOSIS — D22.61 MELANOCYTIC NEVI OF RIGHT UPPER LIMB, INCLUDING SHOULDER: ICD-10-CM

## 2024-11-20 PROCEDURE — 88305 TISSUE EXAM BY PATHOLOGIST: CPT | Performed by: STUDENT IN AN ORGANIZED HEALTH CARE EDUCATION/TRAINING PROGRAM

## 2025-02-26 ENCOUNTER — HOSPITAL ENCOUNTER (OUTPATIENT)
Age: 50
Discharge: HOME | End: 2025-02-26
Payer: COMMERCIAL

## 2025-02-26 VITALS
SYSTOLIC BLOOD PRESSURE: 134 MMHG | RESPIRATION RATE: 17 BRPM | HEART RATE: 64 BPM | DIASTOLIC BLOOD PRESSURE: 97 MMHG | TEMPERATURE: 98 F | OXYGEN SATURATION: 98 %

## 2025-02-26 DIAGNOSIS — J06.9 UPPER RESPIRATORY VIRUS: Primary | ICD-10-CM

## 2025-02-26 LAB
POCT INFLUENZA A: NEGATIVE
POCT INFLUENZA B: NEGATIVE
S PYO AG THROAT QL: NEGATIVE
SARS-COV-2 RNA RESP QL NAA+PROBE: NOT DETECTED

## 2025-02-26 PROCEDURE — U0002 COVID-19 LAB TEST NON-CDC: HCPCS | Performed by: NURSE PRACTITIONER

## 2025-02-26 PROCEDURE — 99213 OFFICE O/P EST LOW 20 MIN: CPT | Performed by: NURSE PRACTITIONER

## 2025-02-26 PROCEDURE — 87502 INFLUENZA DNA AMP PROBE: CPT | Performed by: NURSE PRACTITIONER

## 2025-02-26 PROCEDURE — 87880 STREP A ASSAY W/OPTIC: CPT | Performed by: NURSE PRACTITIONER

## 2025-02-26 NOTE — ED INITIAL ASSESSMENT (HPI)
Pt began 3-4 days ago with a URI that is not improving  Pt with sore throat , runny nose and a cough that kept him up

## 2025-02-26 NOTE — DISCHARGE INSTRUCTIONS
Tylenol or ibuprofen as needed for pain or fever.  Continue supportive care.  Follow-up as needed with your primary care provider.  Return for any concerns.

## 2025-02-26 NOTE — ED PROVIDER NOTES
He    Patient Seen in: [facility]      History     Chief Complaint   Patient presents with    Cough/URI     Stated Complaint: cough  Subjective:   49-year-old male presents for URI symptoms that started about 3 days ago.  His son was sick prior to him.  No fevers or chills.  He has some nasal congestion and a dry cough.  No chest pain or difficulty breathing.  He is eating and drinking without vomiting or diarrhea.  He does have a sore throat.  No difficulty swallowing.  No neck stiffness.  No rashes.  He had a mild headache yesterday.  No headache today.  No dizziness.  He appears nontoxic.      Objective:   Past Medical History:    Back problem    Myeloid leukemia (HCC)    2014            Past Surgical History:   Procedure Laterality Date    Other surgical history  03/06/2018    lumbar spinal fusion    Spine surgery procedure unlisted      lumbar fusion 2018              Social History     Socioeconomic History    Marital status:    Tobacco Use    Smoking status: Never    Smokeless tobacco: Never   Vaping Use    Vaping status: Never Used   Substance and Sexual Activity    Alcohol use: Yes     Alcohol/week: 0.0 standard drinks of alcohol     Comment: socially    Drug use: No   Other Topics Concern    Caffeine Concern Yes     Comment: 1 cup of coffee daily    Exercise Yes     Comment: everyday            Review of Systems    Positive for stated complaint: Cough/URI     Other systems are as noted in HPI.  Constitutional and vital signs reviewed.      All other systems reviewed and negative except as noted above.    Physical Exam     ED Triage Vitals [02/26/25 0937]   BP (!) 134/97   Pulse 64   Resp 17   Temp 98.4 °F (36.9 °C)   Temp src Oral   SpO2 98 %   O2 Device None (Room air)     Current:BP (!) 134/97   Pulse 64   Temp 98.4 °F (36.9 °C) (Oral)   Resp 17   SpO2 98%     Physical Exam  Vitals and nursing note reviewed.   Constitutional:       General: He is not in acute distress.      Appearance: Normal appearance. He is not toxic-appearing.   HENT:      Head: Normocephalic.      Right Ear: Tympanic membrane normal.      Left Ear: Tympanic membrane normal.      Nose: Congestion present. No rhinorrhea.      Mouth/Throat:      Mouth: Mucous membranes are moist.      Pharynx: Oropharynx is clear. Posterior oropharyngeal erythema and postnasal drip present. No oropharyngeal exudate.   Eyes:      Extraocular Movements: Extraocular movements intact.      Conjunctiva/sclera: Conjunctivae normal.      Pupils: Pupils are equal, round, and reactive to light.   Cardiovascular:      Rate and Rhythm: Normal rate and regular rhythm.   Pulmonary:      Effort: Pulmonary effort is normal.      Breath sounds: Normal breath sounds. No wheezing, rhonchi or rales.   Musculoskeletal:         General: Normal range of motion.      Cervical back: Normal range of motion and neck supple.   Lymphadenopathy:      Cervical: No cervical adenopathy.   Skin:     General: Skin is warm and dry.      Capillary Refill: Capillary refill takes less than 2 seconds.      Findings: No rash.   Neurological:      General: No focal deficit present.      Mental Status: He is alert and oriented to person, place, and time.   Psychiatric:         Mood and Affect: Mood normal.         Behavior: Behavior normal.         ED Course   No results found.  Labs Reviewed   POCT RAPID STREP - Normal   POCT FLU TEST - Normal    Narrative:     This assay is a rapid molecular in vitro test utilizing nucleic acid amplification of influenza A and B viral RNA.   RAPID SARS-COV-2 BY PCR - Normal       MDM     Medical Decision Making  The patient is aware of all of his results below.  His symptoms are most likely viral.  We discussed supportive care including Tylenol or Motrin as needed for pain or fever, pushing fluids, and rest.  He will follow-up as needed with his primary care provider.    Amount and/or Complexity of Data Reviewed  Labs: ordered.      Details: Strep negative  COVID-negative  Influenza negative    Risk  OTC drugs.  Risk Details: COVID versus influenza versus strep throat versus URI        Disposition and Plan     Clinical Impression:  1. Upper respiratory virus         Disposition:  Discharge  2/26/2025 10:26 am    Follow-up:  Lamin Corona [place]  [id number] [address] [id number] [phone]    Schedule an appointment as soon as possible for a visit   As needed          Medications Prescribed:  Discharge Medication List as of 2/26/2025 10:28 AM

## 2025-03-04 ENCOUNTER — PATIENT MESSAGE (OUTPATIENT)
Dept: FAMILY MEDICINE CLINIC | Facility: CLINIC | Age: 50
End: 2025-03-04

## 2025-03-06 ENCOUNTER — OFFICE VISIT (OUTPATIENT)
Dept: FAMILY MEDICINE CLINIC | Facility: CLINIC | Age: 50
End: 2025-03-06
Payer: COMMERCIAL

## 2025-03-06 VITALS
DIASTOLIC BLOOD PRESSURE: 84 MMHG | TEMPERATURE: 98 F | WEIGHT: 155 LBS | RESPIRATION RATE: 16 BRPM | OXYGEN SATURATION: 98 % | HEART RATE: 70 BPM | BODY MASS INDEX: 22.96 KG/M2 | HEIGHT: 69.02 IN | SYSTOLIC BLOOD PRESSURE: 134 MMHG

## 2025-03-06 DIAGNOSIS — J06.9 VIRAL URI WITH COUGH: Primary | ICD-10-CM

## 2025-03-06 DIAGNOSIS — Q38.5 PALATE ABNORMALITY: ICD-10-CM

## 2025-03-06 PROCEDURE — 3075F SYST BP GE 130 - 139MM HG: CPT | Performed by: FAMILY MEDICINE

## 2025-03-06 PROCEDURE — 3079F DIAST BP 80-89 MM HG: CPT | Performed by: FAMILY MEDICINE

## 2025-03-06 PROCEDURE — 3008F BODY MASS INDEX DOCD: CPT | Performed by: FAMILY MEDICINE

## 2025-03-06 PROCEDURE — 99213 OFFICE O/P EST LOW 20 MIN: CPT | Performed by: FAMILY MEDICINE

## 2025-03-06 RX ORDER — BENZONATATE 200 MG/1
200 CAPSULE ORAL 3 TIMES DAILY PRN
Qty: 30 CAPSULE | Refills: 0 | Status: SHIPPED | OUTPATIENT
Start: 2025-03-06

## 2025-03-06 RX ORDER — ALBUTEROL SULFATE 90 UG/1
2 INHALANT RESPIRATORY (INHALATION) EVERY 4 HOURS PRN
Qty: 1 EACH | Refills: 0 | Status: SHIPPED | OUTPATIENT
Start: 2025-03-06

## 2025-03-06 RX ORDER — DEXTROMETHORPHAN HYDROBROMIDE AND PROMETHAZINE HYDROCHLORIDE 15; 6.25 MG/5ML; MG/5ML
5 SYRUP ORAL 4 TIMES DAILY PRN
Qty: 118 ML | Refills: 0 | Status: SHIPPED | OUTPATIENT
Start: 2025-03-06

## 2025-03-06 NOTE — PROGRESS NOTES
[facility] Visit Note  3/6/2025      Subjective:      Patient ID: Benny Acosta is a 49 year old male.    Chief Complaint:  Chief Complaint   Patient presents with    Cough     States cough started last week Monday, he went to Urgent Care 2 days later/Wed. His rapid strep/influenza/covid testing were all negative. Today c/o he's still not feeling any better/cough is worse at night, for the last 2 days the roof of his mouth is red & tired with no energy.       HPI:  Benny Acosta is a 49 year old male who is being seen today for the above.      Sick-  Had chills, subjective fever, cough started last week Monday 2/24/25, he went to Urgent Care 2 days later on Wed. His rapid strep/influenza/covid testing were all negative. Was told it was viral.       Today c/o he's still not feeling any better/cough is worse at night, for the last 2 days the roof of his mouth is red & tired with no energy.  Headaches goes to bed with them and waking up with them. Mild wheezing. Felt dizzy a few times.  Chills gone.   The dry cough is the worst symptom. Rare runny/stuffy nose.   Is rarely ever sick. Feels something slipped through the cracks.    Taking: nyquil, theraflu, tylenol, airborne vit drinks x 2 wks.    Noticed top of palate is darker in color started last week. No known trauma to the area. Doesn't recall eating something hot.   Non-smoker, No vaping.    Denies- chest pain, sob, rashes, leg swelling, palpitations      Review of Systems - as stated above in the HPI      Objective:     Vitals:    03/06/25 1036   BP: 134/84   Pulse: 70   Resp: 16   Temp: 98 °F (36.7 °C)   TempSrc: Temporal   SpO2: 98%   Weight: 155 lb (70.3 kg)   Height: 5' 9.02\" (1.753 m)       Physical Examination   General:  Alert, in no acute distress  HEENT: NCAT, EOMI, normal TMs, hard/soft palate area with dime sized red splotchy lesion, Normal nasal turbinates.  Neck:  No cervical lymphadenopathy  CV: Regular rate and rhythm. No  murmurs, gallops, or rubs.  Lungs:  Clear to auscultation B, no wheezes, + rales of the RLL and RML, No rhonchi, normal respiratory effort  Abd:  +bowel sounds, soft  Ext:  No pedal edema,  Pedal pulses 2+ B        Assessment:     1. Viral URI with cough  - benzonatate 200 MG Oral Cap; Take 1 capsule (200 mg total) by mouth 3 (three) times daily as needed.  Dispense: 30 capsule; Refill: 0  - promethazine-dextromethorphan 6.25-15 MG/5ML Oral Syrup; Take 5 mL by mouth 4 (four) times daily as needed.  Dispense: 118 mL; Refill: 0  - albuterol 108 (90 Base) MCG/ACT Inhalation Aero Soln; Inhale 2 puffs into the lungs every 4 (four) hours as needed for Shortness of Breath or Wheezing.  Dispense: 1 each; Refill: 0    2. Palate abnormality  -possible trauma an suspect will resolve on it's own. Informed him if doesn't resolve in 2 wks to call and ask for ENT referral.        Return for if symptoms worsen/don't improve.

## 2025-03-12 ENCOUNTER — APPOINTMENT (OUTPATIENT)
Dept: GENERAL RADIOLOGY | Age: 50
End: 2025-03-12
Attending: NURSE PRACTITIONER
Payer: COMMERCIAL

## 2025-03-12 ENCOUNTER — HOSPITAL ENCOUNTER (OUTPATIENT)
Age: 50
Discharge: HOME | End: 2025-03-12
Payer: COMMERCIAL

## 2025-03-12 VITALS
SYSTOLIC BLOOD PRESSURE: 140 MMHG | OXYGEN SATURATION: 97 % | TEMPERATURE: 98 F | HEART RATE: 105 BPM | DIASTOLIC BLOOD PRESSURE: 70 MMHG | RESPIRATION RATE: 18 BRPM

## 2025-03-12 DIAGNOSIS — J18.9 PNEUMONIA OF RIGHT MIDDLE LOBE DUE TO INFECTIOUS ORGANISM: Primary | ICD-10-CM

## 2025-03-12 DIAGNOSIS — R05.1 ACUTE COUGH: ICD-10-CM

## 2025-03-12 PROCEDURE — 71046 X-RAY EXAM CHEST 2 VIEWS: CPT | Performed by: NURSE PRACTITIONER

## 2025-03-12 PROCEDURE — 99213 OFFICE O/P EST LOW 20 MIN: CPT | Performed by: NURSE PRACTITIONER

## 2025-03-12 RX ORDER — ALBUTEROL SULFATE 90 UG/1
2 INHALANT RESPIRATORY (INHALATION) EVERY 4 HOURS PRN
Qty: 18 G | Refills: 0 | Status: SHIPPED | OUTPATIENT
Start: 2025-03-12

## 2025-03-12 RX ORDER — AMOXICILLIN 500 MG/1
1000 TABLET, FILM COATED ORAL 3 TIMES DAILY
Qty: 30 TABLET | Refills: 0 | Status: SHIPPED | OUTPATIENT
Start: 2025-03-12 | End: 2025-03-17

## 2025-03-12 NOTE — ED INITIAL ASSESSMENT (HPI)
Pt c/o continued cough. Pt states he was seen 2 weeks ago all testing was negative, flu,covid and strep.  Pt denies fever. Pt states the cough is dry.

## 2025-03-12 NOTE — ED PROVIDER NOTES
Patient Seen in: [facility]      History     Chief Complaint   Patient presents with    Cough/URI    Headache     Stated Complaint: cough, headaches    Subjective:   Very pleasant 49-year-old male presents to immediate care for cough, congestion.  Patient states has had symptoms for 3 weeks was seen here 2 weeks ago had negative swabs.  States cough is not getting any better states he is having dry cough throughout the day.  Denies any chest pain denies fever, denies shortness of breath      Objective:     Past Medical History:    Back problem    Myeloid leukemia (HCC)    2014              Past Surgical History:   Procedure Laterality Date    Other surgical history  03/06/2018    lumbar spinal fusion    Spine surgery procedure unlisted      lumbar fusion 2018                Social History     Socioeconomic History    Marital status:    Tobacco Use    Smoking status: Never    Smokeless tobacco: Never   Vaping Use    Vaping status: Never Used   Substance and Sexual Activity    Alcohol use: Yes     Alcohol/week: 0.0 standard drinks of alcohol     Comment: socially    Drug use: No   Other Topics Concern    Caffeine Concern Yes     Comment: 1 cup of coffee daily    Exercise Yes     Comment: everyday              Review of Systems   Constitutional: Negative.    Respiratory: Negative.     Cardiovascular: Negative.    Gastrointestinal: Negative.    Skin: Negative.    Neurological: Negative.        Positive for stated complaint: cough, headaches  Other systems are as noted in HPI.  Constitutional and vital signs reviewed.      All other systems reviewed and negative except as noted above.    Physical Exam     ED Triage Vitals [03/12/25 1336]   /70   Pulse 105   Resp 18   Temp 97.7 °F (36.5 °C)   Temp src Oral   SpO2 97 %   O2 Device None (Room air)       Current Vitals:   Vital Signs  BP: 140/70  Pulse: 105  Resp: 18  Temp: 97.7 °F (36.5 °C)  Temp src: Oral    Oxygen Therapy  SpO2: 97  %  O2 Device: None (Room air)        Physical Exam  Vitals and nursing note reviewed.   Constitutional:       General: He is not in acute distress.  HENT:      Head: Normocephalic.   Cardiovascular:      Rate and Rhythm: Normal rate.   Pulmonary:      Effort: Pulmonary effort is normal.      Breath sounds: Examination of the right-middle field reveals wheezing. Wheezing present.   Musculoskeletal:         General: Normal range of motion.   Skin:     General: Skin is warm and dry.   Neurological:      General: No focal deficit present.      Mental Status: He is alert and oriented to person, place, and time.           ED Course   Labs Reviewed - No data to display  XR CHEST PA + LAT CHEST (CPT=71046)    Result Date: 3/12/2025  PROCEDURE:  XR CHEST PA + LAT CHEST (CPT=71046)  INDICATIONS:  cough, headaches  COMPARISON:  [facility], XR, XR CHEST PA + LAT CHEST (CPT=71020), 1/02/2014, 5:55 PM.  TECHNIQUE:  PA and lateral chest radiographs were obtained.  PATIENT STATED HISTORY: (As transcribed by Technologist)  Patient states dry cough and headaches for 3 weeks.              CONCLUSION:    Developing subtle atelectasis or subtle infiltrate in the middle lobe.  Lungs otherwise clear.  Heart normal size.  No pleural effusion pneumothorax or hyperinflation.  No mediastinal widening.  LOCATION:  [facility]   Dictated by (CST): Isaias Levi MD on 3/12/2025 at 2:54 PM     Finalized by (CST): Isaias Levi MD on 3/12/2025 at 2:55 PM         MDM    Medical Decision Making  Pertinent Labs & Imaging studies reviewed. (See chart for details).  Patient coming in with cough, congestion.   Differential diagnosis includes pneumonia, bronchitis     Patient is comfortable with plan of care.     Overall Pt looks good. Non-toxic, well-hydrated and in no respiratory distress. Vital signs are reassuring. Exam is reassuring. I do not believe pt requires and additional diagnostic studies or intervention. I believe pt can be discharged home to  continue evaluation as an outpatient. Follow-up provider given. Discharge instructions given and reviewed. Return for any problems. All understand and agrees with the plan.        Problems Addressed:  Acute cough: acute illness or injury  Pneumonia of right middle lobe due to infectious organism: acute illness or injury        Disposition and Plan     Clinical Impression:  1. Pneumonia of right middle lobe due to infectious organism    2. Acute cough         Disposition:  Discharge  3/12/2025  3:00 pm    Follow-up:  Lamin Corona [place]  [id number] [address] [id number] [phone]        Medications Prescribed:  Discharge Medication List as of 3/12/2025  3:00 PM        START taking these medications    Details   amoxicillin 500 MG Oral Tab Take 2 tablets (1,000 mg total) by mouth 3 (three) times daily for 5 days., Normal, Disp-30 tablet, R-0      !! albuterol 108 (90 Base) MCG/ACT Inhalation Aero Soln Inhale 2 puffs into the lungs every 4 (four) hours as needed for Wheezing., Normal, Disp-18 g, R-0       !! - Potential duplicate medications found. Please discuss with provider.              Supplementary Documentation:

## 2025-04-09 ENCOUNTER — OFFICE VISIT (OUTPATIENT)
Age: 50
End: 2025-04-09
Attending: SPECIALIST
Payer: COMMERCIAL

## 2025-04-09 VITALS
RESPIRATION RATE: 18 BRPM | TEMPERATURE: 98 F | SYSTOLIC BLOOD PRESSURE: 149 MMHG | OXYGEN SATURATION: 98 % | HEART RATE: 66 BPM | HEIGHT: 69.02 IN | DIASTOLIC BLOOD PRESSURE: 99 MMHG | WEIGHT: 155.81 LBS | BODY MASS INDEX: 23.08 KG/M2

## 2025-04-09 DIAGNOSIS — C92.10 CML (CHRONIC MYELOID LEUKEMIA) (HCC): ICD-10-CM

## 2025-04-09 LAB
ALBUMIN SERPL-MCNC: 4.7 G/DL (ref 3.2–4.8)
ALBUMIN/GLOB SERPL: 1.9 {RATIO} (ref 1–2)
ALP LIVER SERPL-CCNC: 76 U/L (ref 45–117)
ALT SERPL-CCNC: 31 U/L (ref 10–49)
ANION GAP SERPL CALC-SCNC: 4 MMOL/L (ref 0–18)
AST SERPL-CCNC: 28 U/L (ref ?–34)
BASOPHILS # BLD AUTO: 0.04 X10(3) UL (ref 0–0.2)
BASOPHILS NFR BLD AUTO: 0.6 %
BILIRUB SERPL-MCNC: 0.8 MG/DL (ref 0.3–1.2)
BUN BLD-MCNC: 17 MG/DL (ref 9–23)
CALCIUM BLD-MCNC: 9.4 MG/DL (ref 8.7–10.6)
CHLORIDE SERPL-SCNC: 104 MMOL/L (ref 98–112)
CO2 SERPL-SCNC: 30 MMOL/L (ref 21–32)
CREAT BLD-MCNC: 0.99 MG/DL (ref 0.7–1.3)
EGFRCR SERPLBLD CKD-EPI 2021: 93 ML/MIN/1.73M2 (ref 60–?)
EOSINOPHIL # BLD AUTO: 0.13 X10(3) UL (ref 0–0.7)
EOSINOPHIL NFR BLD AUTO: 2 %
ERYTHROCYTE [DISTWIDTH] IN BLOOD BY AUTOMATED COUNT: 12.7 %
FASTING STATUS PATIENT QL REPORTED: NO
GLOBULIN PLAS-MCNC: 2.5 G/DL (ref 2–3.5)
GLUCOSE BLD-MCNC: 89 MG/DL (ref 70–99)
HCT VFR BLD AUTO: 43.8 % (ref 39–53)
HGB BLD-MCNC: 15.5 G/DL (ref 13–17.5)
IMM GRANULOCYTES # BLD AUTO: 0.02 X10(3) UL (ref 0–1)
IMM GRANULOCYTES NFR BLD: 0.3 %
LYMPHOCYTES # BLD AUTO: 1.09 X10(3) UL (ref 1–4)
LYMPHOCYTES NFR BLD AUTO: 16.8 %
MCH RBC QN AUTO: 31.7 PG (ref 26–34)
MCHC RBC AUTO-ENTMCNC: 35.4 G/DL (ref 31–37)
MCV RBC AUTO: 89.6 FL (ref 80–100)
MONOCYTES # BLD AUTO: 0.64 X10(3) UL (ref 0.1–1)
MONOCYTES NFR BLD AUTO: 9.9 %
NEUTROPHILS # BLD AUTO: 4.56 X10 (3) UL (ref 1.5–7.7)
NEUTROPHILS # BLD AUTO: 4.56 X10(3) UL (ref 1.5–7.7)
NEUTROPHILS NFR BLD AUTO: 70.4 %
OSMOLALITY SERPL CALC.SUM OF ELEC: 287 MOSM/KG (ref 275–295)
PLATELET # BLD AUTO: 257 10(3)UL (ref 150–450)
POTASSIUM SERPL-SCNC: 4.9 MMOL/L (ref 3.5–5.1)
PROT SERPL-MCNC: 7.2 G/DL (ref 5.7–8.2)
RBC # BLD AUTO: 4.89 X10(6)UL (ref 4.3–5.7)
SODIUM SERPL-SCNC: 138 MMOL/L (ref 136–145)
WBC # BLD AUTO: 6.5 X10(3) UL (ref 4–11)

## 2025-04-09 NOTE — PROGRESS NOTES
Patient is here for 6 month MD follow up for CML. Labs drawn today. Feeling well. No concerns at present time.       Education Record    Learner:  Patient    Disease / Diagnosis:  CML    Barriers / Limitations:  None   Comments:    Method:  Discussion   Comments:    General Topics:  Plan of care reviewed   Comments:    Outcome:  Shows understanding   Comments:

## 2025-04-10 NOTE — PROGRESS NOTES
[facility] Progress Note       Patient Name: Benny Acosta   YOB: 1975  Medical Record Number: [id number]  Attending Physician: Geovani Brown M.D.     The 21st Century Cures Act makes medical notes like these available to patients in the interest of transparency. Please be advised this is a medical document. Medical documents are intended to carry relevant information, facts as evident, and the clinical opinion of the practitioner. The medical note is intended as peer to peer communication and may appear blunt or direct. It is written in medical language and may contain abbreviations or verbiage that are unfamiliar.     Date of Visit: 4/9/2025       Chief Complaint  Chronic myeloid leukemia - follow up.    Oncologic History  Benny Acosta is a 49 year old male who presented to emergency room on 01/02/2014 with complaints of substernal chest discomfort.  A complete blood count revealed a total white blood cell count of 133 K/mcl.  Quantitative PCR for BCR/ABL showed a ratio of 0.67944.  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    At diagnosis patient did not have an enlarged spleen.  His platelet count was normal and there were no basophils or blasts in the peripheral blood.  His Minor score was low at 0.55.  While awaiting confirmation of the diagnosis, he was started on hydroxyruea.    In mid 01/2014 he began nilotinib.  EKG performed one week after starting the medication as compared to the EKG performed prior to the start of medication showed no QTc prolongation.    Patient was evaluated by Dr. Lynn at the [facility] and the decision was made not to pursue stem cell transplant or  even HLA typing of the patient/siblings at this time.    Bone marrow biopsy on 07/09/2014 showed a complete cytogenetic response and quantitative PCR for BCR/ABL on the same day showed an MR3 major molecular response. In summer 2015 patient discontinued the medication as he and his wife were trying for a child. He did not restart therapy.     Routine follow up laboratory studies on 04/25/2022 showed a detectable BCR-ABL1 IS of 0.0086. The following value in 07/2022 was undetectable.     History of Present Illness  Patient returns for follow. Patient has no complaints. He denies any fevers, night sweats, weight loss. He denies any abdominal pain. He continues regular follow up with dermatology.     Performance Status   Karnofsky 100% - Normal, no complaints.    Past Medical History (historical data, reviewed by physician)  CML (as above); melanoma.     Past Surgical History (historical data, reviewed by physician)  Spine surgery; vasectomy; wide excision and sentinel lymph node biopsy for melanoma.     Family History (historical data, reviewed by physician)  No known family history of hematologic malignancy.  Patient has one brother and one sister, neither lives in [place].    Social History (historical data, reviewed by physician)  Denies tobacco and illicit drug use; uses alcohol socially.      Current Medications   No outpatient medications have been marked as taking for the 4/9/25 encounter (Office Visit) with Geovani Brown MD.     Allergies   Mr. Acosta has no known allergies.      Vital Signs   BP (!) 149/99 (BP Location: Right arm, Patient Position: Sitting, Cuff Size: adult)   Pulse 66   Temp 97.8 °F (36.6 °C) (Temporal)   Resp 18   Ht 1.753 m (5' 9.02\")   Wt 70.7 kg (155 lb 12.8 oz)   SpO2 98%   BMI 23.00 kg/m²     Physical Examination   Constitutional  Well developed and well nourished; in no apparent distress; appears close to chronological age.  Head   Normocephalic and  atraumatic.  Eyes   Conjunctiva clear; sclera anicteric.  ENMT   External nose normal; external ears normal.    Neck   Supple; no masses.   Hematologic/Lymphatic No petechiae or purpura. No cervical, supraclavicular, axillary adenopathy.   Neck   Supple, no masses.  Respiratory  Normal effort; no respiratory distress.  Clear to auscultation bilaterally.  Cardiovascular  Regular rate and rhythm.   Abdomen  Soft, non-tender, non-distended, normoactive bowel sounds.  No hepatosplenomegaly.  Extremities  No lower extremity edema.  Neurologic  Motor and sensory grossly intact.  Psychiatric  Mood and affect appropriate.    Laboratory   Recent Results (from the past week)   COMP METABOLIC PANEL [E]    Collection Time: 04/09/25  2:48 PM   Result Value Ref Range    Glucose 89 70 - 99 mg/dL    Sodium 138 136 - 145 mmol/L    Potassium 4.9 3.5 - 5.1 mmol/L    Chloride 104 98 - 112 mmol/L    CO2 30.0 21.0 - 32.0 mmol/L    Anion Gap 4 0 - 18 mmol/L    BUN 17 9 - 23 mg/dL    Creatinine 0.99 0.70 - 1.30 mg/dL    Calcium, Total 9.4 8.7 - 10.6 mg/dL    Calculated Osmolality 287 275 - 295 mOsm/kg    eGFR-Cr 93 >=60 mL/min/1.73m2    AST 28 <34 U/L    ALT 31 10 - 49 U/L    Alkaline Phosphatase 76 45 - 117 U/L    Bilirubin, Total 0.8 0.3 - 1.2 mg/dL    Total Protein 7.2 5.7 - 8.2 g/dL    Albumin 4.7 3.2 - 4.8 g/dL    Globulin  2.5 2.0 - 3.5 g/dL    A/G Ratio 1.9 1.0 - 2.0    Patient Fasting for CMP? No    CBC w/Auto Diff    Collection Time: 04/09/25  2:48 PM   Result Value Ref Range    WBC 6.5 4.0 - 11.0 x10(3) uL    RBC 4.89 4.30 - 5.70 x10(6)uL    HGB 15.5 13.0 - 17.5 g/dL    HCT 43.8 39.0 - 53.0 %    .0 150.0 - 450.0 10(3)uL    MCV 89.6 80.0 - 100.0 fL    MCH 31.7 26.0 - 34.0 pg    MCHC 35.4 31.0 - 37.0 g/dL    RDW 12.7 %    Neutrophil Absolute Prelim 4.56 1.50 - 7.70 x10 (3) uL    Neutrophil Absolute 4.56 1.50 - 7.70 x10(3) uL    Lymphocyte Absolute 1.09 1.00 - 4.00 x10(3) uL    Monocyte Absolute 0.64 0.10 - 1.00 x10(3) uL     Eosinophil Absolute 0.13 0.00 - 0.70 x10(3) uL    Basophil Absolute 0.04 0.00 - 0.20 x10(3) uL    Immature Granulocyte Absolute 0.02 0.00 - 1.00 x10(3) uL    Neutrophil % 70.4 %    Lymphocyte % 16.8 %    Monocyte % 9.9 %    Eosinophil % 2.0 %    Basophil % 0.6 %    Immature Granulocyte % 0.3 %     Impression and Plan   1.   CML: Patient has chronic phase disease with a low Minor score at diagnosis. Patient remains off therapy. Quantitative PCR for BCR/ABL is pending. If it remains undetectable, patient will return for follow up in 6 months.    Planned Follow Up   Patient will be contacted with his laboratory results; he will return for follow up in 6 months.    Electronically Signed by:     Geovani Brown M.D.  System Medical Director, Oncology Services  [place] [facility]

## 2025-04-14 LAB
BCR-ABL1 INTERPRETATION: DETECTED
NS MOLECULAR RESPONSE VALUE: >4.52

## 2025-05-15 NOTE — PROGRESS NOTES
[facility]    Patient Name:  Benny Acosta   YOB: 1975   Gender:  Male   Appt Date:  5/15/2025   Provider:  Noel Cormier MD   Insurance:  [id number]     PATIENT PROVIDERS  Referring Provider: Dr. Cross    Primary Care Provider:Lamin Corona MD   Address: [address] [place] [id number]   Phone #: [phone]       CHIEF COMPLAINT  No chief complaint on file.  Melanoma left back     PROBLEMS  Reviewed   Patient Active Problem List   Diagnosis    CML (chronic myeloid leukemia) (HCC)    Acute lymphangitis of left axilla    Open wound of finger with complication    Pain of right lower extremity    Myalgia    Numerous moles    Lumbar foraminal stenosis    Right leg pain    DDD (degenerative disc disease), lumbar    Bulge of lumbar disc without myelopathy    Lumbar radiculitis    Sterilization    Elevated blood pressure reading    Mass of toe    Malignant melanoma of back (HCC)    Rosacea        History of Present Illness:  Benny Acosta  Presents for his 1 year follow up. This is a 49 year old year old Male with PMH CML 2014, now in remission, followed by Dr. Brown, who was found to have T2a melanoma left back. He was taken to the operating room on 4/22/2021 for a wide excision melanoma left upper back and sentinel lymph node biopsy. Pathology returned to show excision specimen with two separate foci of invasive melanoma (Breslow depth 0.7 mm and 0.18 mm). There was another small focus of melanoma in situ adjacent to invasive melanoma, seen 6 mm from closest 9-12 o'clock margin. All margins negative. [place] lymph nodes (2) negative for metastatic melanoma. Final stage pT2aN0  Patient returns today for follow-up.    He is doing well.  He notes no new skin lesions, no new lumps or bumps.  He otherwise feels well.     Vital Signs:  There were no vitals taken for this visit.     Medications Reviewed:  No current  outpatient medications on file.     Allergies Reviewed:  No Known Allergies     History:  Reviewed:  Past Medical History:    Back problem    Myeloid leukemia (HCC)    2014      Reviewed:  Past Surgical History:   Procedure Laterality Date    Other surgical history  03/06/2018    lumbar spinal fusion    Spine surgery procedure unlisted      lumbar fusion 2018      Reviewed Social History:  Social History     Socioeconomic History    Marital status:    Tobacco Use    Smoking status: Never    Smokeless tobacco: Never   Vaping Use    Vaping status: Never Used   Substance and Sexual Activity    Alcohol use: Yes     Alcohol/week: 0.0 standard drinks of alcohol     Comment: socially    Drug use: No   Other Topics Concern    Caffeine Concern Yes     Comment: 1 cup of coffee daily    Exercise Yes     Comment: everyday      Reviewed:  Family History   Problem Relation Age of Onset    No Known Problems Father     No Known Problems Mother         Review of Systems:  Review of Systems   Constitutional:  Negative for chills, fatigue and fever.   Gastrointestinal:  Negative for abdominal distention, abdominal pain, constipation, diarrhea, nausea and vomiting.   Genitourinary:  Negative for dysuria and hematuria.   Skin:  Negative for rash and wound.        Physical Examination:  Physical Exam  Constitutional:       General: He is not in acute distress.     Appearance: He is well-developed.   Pulmonary:      Effort: Pulmonary effort is normal.   Abdominal:      General: There is no distension.      Palpations: Abdomen is soft. There is no mass.      Tenderness: There is no abdominal tenderness.   Musculoskeletal:         General: Normal range of motion.   Lymphadenopathy:      Cervical: No cervical adenopathy.      Upper Body:      Right upper body: No supraclavicular or axillary adenopathy.      Left upper body: No supraclavicular or axillary adenopathy.      Lower Body: No right inguinal adenopathy. No left inguinal  adenopathy.   Skin:     General: Skin is warm and dry.              Document Review:  PATHOLOGY 4/22/2021:  Final Diagnosis:      A. [place] lymph node #1; dissection:   Single lymph node, negative for melanoma (immunohistochemical stains for SOX-10 and Melan-A (appropriate control reactivity) are negative and support the above diagnosis.     B. [place] lymph node #2; dissection:   Single small lymph node, negative for melanoma (immunohistochemical stains for SOX-10 and Melan-A (appropriate control reactivity) are negative and support the above diagnosis.     C. Skin, back; wide local excision:   History of invasive malignant melanoma (Breslow's depth - 1.8 mm; at least Rudy level IV) ([id number]).   Current specimen with two separate foci of invasive malignant melanoma (Breslow depth 0.7 mm and 0.18 mm,  Rudy's level III and II) (see comment).   Small focus of melanoma in situ adjacent to invasive melanoma, seen at a distance of 6 mm from the closest 9-12 o'clock margin.  Invasive melanoma is seen at a distance of approximately 7 mm from 9-12 o'clock and 12-3 o'clock margins.  Previous biopsy site changes.  All peripheral and deep margins are free of melanoma.  Preliminary pathologic staging pT2a, N0     Comment:  The updated staging summary also includes findings from previous shave biopsy ([id number]).     Immunohistochemical stain for Melan-A performed on blocks CD13, CD14, CD15, C16 and C17 are negative for dermal melanocytes.  Melan-A performed on block C11and C18 highlight nests of melanocytes in the dermis and confirmsthe diagnosis of invasive melanoma.     The current specimen shows 2 separate foci of invasive malignant melanoma away from the previous biopsy site (Breslow depth 0.7 mm and 0.18 mm,  Rudy's level III and II)  in blocks C 11 and C 18 respectively.  Clinical correlation with close clinical follow-up is recommended.        TUMOR   Tumor Site  Skin of trunk: left paraspinal region          Histologic Type  Nodular melanoma    Maximum Tumor (Breslow) Thickness (Millimeters)  1.8 mm   Macroscopic Satellite Nodule(s)  Not identified    Ulceration  Not identified    Anatomic (Rudy) Level  IV (Melanoma invades reticular dermis)    Mitotic Rate  1 mitoses / mm2   Microsatellite(s)  Present    Lymphovascular Invasion  Not identified    Neurotropism  Not identified    Tumor-Infiltrating Lymphocytes  Present, brisk    Tumor Regression  Not identified    MARGINS     Peripheral Margins  Negative for invasive melanoma    Distance of Invasive Melanoma from Closest Peripheral Margin (Millimeters)  7 mm   Location  9-12 o'clock, 12-3 o'clock    Status of Melanoma in situ at Peripheral Margins  Negative for melanoma in situ    Distance of Melanoma in situ from Closest Peripheral Margin (Millimeters)  6 mm   Location  9-12 o' cloock    Deep Margin  Negative for invasive melanoma    Distance of Invasive Melanoma from Deep Margin (Millimeters)  20 mm   Status of Melanoma in situ at Deep Margin  Negative for melanoma in situ    Distance of Melanoma in situ from Deep Margin (Millimeters)  21 mm   LYMPH NODES     Regional Lymph Nodes  Uninvolved by tumor cells    Total Number of Lymph Nodes Examined  2    Number of [place] Nodes Examined  2    PATHOLOGIC STAGE CLASSIFICATION (pTNM, AJCC 8th Edition)     Primary Tumor (pT)  pT2a    Regional Lymph Nodes (pN)  pN0         Assessment / Plan:  49 year old year old Male with PMH CML 2014, now in remission, followed by Dr. Brown, who presented with pT2a melanoma left upper back. He is s/p for a wide excision melanoma left upper back and sentinel lymph node biopsy on 4/22/2021. Pathology returned to show excision specimen with two separate foci of invasive melanoma (Breslow depth 0.7 mm and 0.18 mm). There was another small focus of melanoma in situ adjacent to invasive melanoma, seen 6 mm from closest 9-12 o'clock margin. All margins negative. [place] lymph nodes (2)  negative for metastatic melanoma.     Remains MENDEL  Discussed that risk of recurrence remains low, continue follow-up and surveillance per dermatology  Follow-up with me on an as-needed basis    Noel Cormier MD  [facility] General Surgical Oncology  [facility]  Pager [phone]  [email or web address]        Follow Up:  No follow-ups on file.

## 2025-05-16 ENCOUNTER — OFFICE VISIT (OUTPATIENT)
Dept: SURGERY | Facility: CLINIC | Age: 50
End: 2025-05-16
Payer: COMMERCIAL

## 2025-05-16 DIAGNOSIS — C43.59 MALIGNANT MELANOMA OF BACK (HCC): Primary | ICD-10-CM

## 2025-05-16 PROCEDURE — 99215 OFFICE O/P EST HI 40 MIN: CPT | Performed by: SURGERY

## 2025-05-29 ENCOUNTER — DOCUMENTATION ONLY (OUTPATIENT)
Dept: SURGERY | Facility: CLINIC | Age: 50
End: 2025-05-29

## 2025-06-02 ENCOUNTER — HOSPITAL ENCOUNTER (OUTPATIENT)
Age: 50
Discharge: HOME | End: 2025-06-02
Payer: COMMERCIAL

## 2025-06-02 ENCOUNTER — APPOINTMENT (OUTPATIENT)
Dept: GENERAL RADIOLOGY | Age: 50
End: 2025-06-02
Attending: PHYSICIAN ASSISTANT
Payer: COMMERCIAL

## 2025-06-02 VITALS
SYSTOLIC BLOOD PRESSURE: 151 MMHG | RESPIRATION RATE: 18 BRPM | OXYGEN SATURATION: 99 % | DIASTOLIC BLOOD PRESSURE: 82 MMHG | HEART RATE: 62 BPM | TEMPERATURE: 98 F

## 2025-06-02 DIAGNOSIS — S69.91XA FINGER INJURY, RIGHT, INITIAL ENCOUNTER: Primary | ICD-10-CM

## 2025-06-02 PROCEDURE — 73140 X-RAY EXAM OF FINGER(S): CPT | Performed by: PHYSICIAN ASSISTANT

## 2025-06-02 PROCEDURE — 99213 OFFICE O/P EST LOW 20 MIN: CPT | Performed by: PHYSICIAN ASSISTANT

## 2025-06-02 NOTE — ED INITIAL ASSESSMENT (HPI)
Pt here c/o rt 4th finger pain since rock climbing on Mother's Day weekend.   C/o pain to finger.

## 2025-06-02 NOTE — ED PROVIDER NOTES
Patient Seen in: [facility]mediate [facility]        History  Chief Complaint   Patient presents with    Finger Injury     Stated Complaint: Pt is requesting  ring finger xray    Subjective:   HPI            48 YO male presents to immediate care for evaluation of right fourth finger discomfort since rock climbing 3 weeks ago. ROM intact but states \"it just doesn't feel right.\"  He denies taking any OTC analgesics stating finger is not really painful.        Objective:     Past Medical History:    Back problem    Myeloid leukemia (HCC)    2014              Past Surgical History:   Procedure Laterality Date    Other surgical history  03/06/2018    lumbar spinal fusion    Spine surgery procedure unlisted      lumbar fusion 2018                No pertinent social history.            Review of Systems    Positive for stated complaint: Pt is requesting  ring finger xray  Other systems are as noted in HPI.  Constitutional and vital signs reviewed.      All other systems reviewed and negative except as noted above.                  Physical Exam    ED Triage Vitals [06/02/25 0844]   /82   Pulse 62   Resp 18   Temp 98 °F (36.7 °C)   Temp src Oral   SpO2 99 %   O2 Device None (Room air)       Current Vitals:   Vital Signs  BP: 151/82  Pulse: 62  Resp: 18  Temp: 98 °F (36.7 °C)  Temp src: Oral    Oxygen Therapy  SpO2: 99 %  O2 Device: None (Room air)          Physical Exam  Vitals and nursing note reviewed.   Constitutional:       General: He is not in acute distress.     Appearance: Normal appearance. He is not ill-appearing, toxic-appearing or diaphoretic.   Cardiovascular:      Rate and Rhythm: Normal rate.   Pulmonary:      Effort: Pulmonary effort is normal. No respiratory distress.   Musculoskeletal:      Right hand: Tenderness (mild diffuse tenderness to fourth finger) present. No deformity. Normal range of motion. Normal sensation.   Neurological:      Mental Status: He is alert and oriented to person,  place, and time.   Psychiatric:         Behavior: Behavior normal.         ED Course  Labs Reviewed - No data to display  XR FINGER(S) (MIN 2 VIEWS), RIGHT 4TH (CPT=73140)  Result Date: 6/2/2025  PROCEDURE:  XR FINGER(S) (MIN 2 VIEWS), RIGHT 4TH (CPT=73140)  INDICATIONS:  Pt is requesting  ring finger xray  COMPARISON:  None.  TECHNIQUE:  Three views of the finger were obtained.  PATIENT STATED HISTORY: (As transcribed by Technologist)  Pain of the entire 4th digit right hand. Symptoms began since rock climbing 3 weeks ago.    FINDINGS:  Osseous structures are intact. Joint spaces are preserved. No dislocation.            CONCLUSION:  No acute visible fracture.  See above description.  Continued clinical correlation is recommended.   LOCATION:  [facility]   Dictated by (CST): Adalgisa Vuong MD on 6/02/2025 at 9:09 AM     Finalized by (CST): Adalgisa Vuong MD on 6/02/2025 at 9:09 AM       I independently reviewed x-rays.  No acute osseous findings.     MDM     Differential diagnosis considered but not limited to contusion, sprain/strain, fracture    Patient with mild diffuse tenderness to right fourth finger.  ROM intact.  X-ray negative.  Conservative initial management discussed.  Patient to continue follow-up with Hand if pain persists or worsens.        Medical Decision Making  Amount and/or Complexity of Data Reviewed  Radiology: ordered and independent interpretation performed. Decision-making details documented in ED Course.    Risk  OTC drugs.        Disposition and Plan     Clinical Impression:  1. Finger injury, right, initial encounter         Disposition:  Discharge  6/2/2025  9:28 am    Follow-up:  Lavonne Jacobsen PA  [address] [place] [id number]  [phone]    Schedule an appointment as soon as possible for a visit   If symptoms persist or worsen          Medications Prescribed:  There are no discharge medications for this patient.            Supplementary Documentation:

## 2025-06-18 ENCOUNTER — OFFICE VISIT (OUTPATIENT)
Dept: ORTHOPEDICS CLINIC | Facility: CLINIC | Age: 50
End: 2025-06-18
Payer: COMMERCIAL

## 2025-06-18 VITALS — BODY MASS INDEX: 22.96 KG/M2 | HEIGHT: 69 IN | WEIGHT: 155 LBS

## 2025-06-18 DIAGNOSIS — S63.634A SPRAIN OF INTERPHALANGEAL JOINT OF RIGHT RING FINGER, INITIAL ENCOUNTER: Primary | ICD-10-CM

## 2025-06-18 PROCEDURE — 99213 OFFICE O/P EST LOW 20 MIN: CPT | Performed by: PHYSICIAN ASSISTANT

## 2025-06-18 PROCEDURE — 3008F BODY MASS INDEX DOCD: CPT | Performed by: PHYSICIAN ASSISTANT

## 2025-06-18 NOTE — H&P
Clinic Note      Assessment/Plan:  49 year old male    Right ring finger collateral ligament sprain/volar plate sprain versus A2 pulley rupture-he is very tender over the proximal volar phalanx and would like to rule out A2 pulley rupture with an MRI.  If this looks okay on MRI I may recommend therapy and continued stretching exercises.  All of his questions were answered      ICD-10-CM    1. Sprain of interphalangeal joint of right ring finger, initial encounter  S63.634A MRI FINGER(S), RIGHT 4TH (CPT=73218)           Follow Up: After my    Diagnostic Studies:  X-rays are negative for acute fracture description or deformity    Physical Exam:    Ht 5' 9\" (1.753 m)   Wt 155 lb (70.3 kg)   BMI 22.89 kg/m²     Constitutional: NAD. AOx3. Well-developed and Well-nourished.   Psychiatric: Normal mood/ affect/ behavior. Judgment and thought content normal.     Right upper Extremity:   Inspection: Skin Intact. No skin lesions. No gross deformity.   Palpation: Tender ovation along the volar P1 mildly tender over the collateral ligaments.  Negative Pool's test.  No obvious bowstringing felt on exam   Motion: Elbow: normal bilateral symmetric ext/flex  Wrist: normal bilateral symmetric ext/flex/sup/pro  Finger: full composite fist       CC: Other (RT RING FINGER; ONSET: 5/12/25; ROCK CLIMBING )        HPI: This 49 year old male presents with complaints of pain to his right ring finger.  He states that he was rockclimbing on 5/12/2025 and had an injury when he was gripping.  Now he has pain worse in the morning and stiffness along the entire finger.  He describes the pain as uncomfortable but rates it moderate.  The pain sometimes wakes him up at night but he has not had any significant treatment.    Past Medical History[1]    Past Surgical History[2]    Current Medications[3]    Allergies[4]    Family History[5]    Social History     Occupational History    Not on file   Tobacco Use    Smoking status: Never    Smokeless  tobacco: Never   Vaping Use    Vaping status: Never Used   Substance and Sexual Activity    Alcohol use: Yes     Alcohol/week: 0.0 standard drinks of alcohol     Comment: socially    Drug use: No    Sexual activity: Not on file        Review of Systems (negative unless bolded):  General: fevers, chills, fatigue  CV:  chest pain, palpitations, leg swelling  Msk: bodyaches, neck pain, neck stiffness  Skin: rashes, open wounds, nonhealing ulcers  Hem: bleeds easily, bruise easily, immunocompromised  Neuro: dizziness, light headedness, headaches  Psych: anxious, depressed, anger issues  Lavonne Jacobsen PA-C  Hand, Wrist, & Elbow Surgery  Physician Assistant to Dr. Дмитрий [email or web address]  t: [phone]  f: [phone]         [1]   Past Medical History:   Back problem    Myeloid leukemia (HCC)    2014   [2]   Past Surgical History:  Procedure Laterality Date    Other surgical history  03/06/2018    lumbar spinal fusion    Spine surgery procedure unlisted      lumbar fusion 2018   [3]   No current outpatient medications on file.   [4] No Known Allergies  [5]   Family History  Problem Relation Age of Onset    No Known Problems Father     No Known Problems Mother

## 2025-07-08 ENCOUNTER — PATIENT MESSAGE (OUTPATIENT)
Dept: FAMILY MEDICINE CLINIC | Facility: CLINIC | Age: 50
End: 2025-07-08

## 2025-07-08 DIAGNOSIS — Z12.11 SCREEN FOR COLON CANCER: Primary | ICD-10-CM

## 2025-07-08 DIAGNOSIS — M54.16 LUMBAR RADICULITIS: ICD-10-CM

## 2025-07-08 DIAGNOSIS — M51.369 DEGENERATION OF INTERVERTEBRAL DISC OF LUMBAR REGION, UNSPECIFIED WHETHER PAIN PRESENT: ICD-10-CM

## 2025-07-08 DIAGNOSIS — M48.061 LUMBAR FORAMINAL STENOSIS: ICD-10-CM

## 2025-08-26 ENCOUNTER — PATIENT MESSAGE (OUTPATIENT)
Dept: FAMILY MEDICINE CLINIC | Facility: CLINIC | Age: 50
End: 2025-08-26

## (undated) DIAGNOSIS — C92.10 CML (CHRONIC MYELOID LEUKEMIA) (HCC): Primary | ICD-10-CM

## (undated) DEVICE — X-RAY DETECTABLE SPONGES,16 PLY: Brand: VISTEC

## (undated) DEVICE — SUTURE PROLENE 2-0 SH

## (undated) DEVICE — SUTURE VICRYL 3-0 SH

## (undated) DEVICE — GAMMEX® PI HYBRID SIZE 6, STERILE POWDER-FREE SURGICAL GLOVE, POLYISOPRENE AND NEOPRENE BLEND: Brand: GAMMEX

## (undated) DEVICE — PEN: MARKING STD PT 100/CS: Brand: MEDICAL ACTION INDUSTRIES

## (undated) DEVICE — PROXIMATE SKIN STAPLERS (35 WIDE) CONTAINS 35 STAINLESS STEEL STAPLES (FIXED HEAD): Brand: PROXIMATE

## (undated) DEVICE — UNDYED BRAIDED (POLYGLACTIN 910), SYNTHETIC ABSORBABLE SUTURE: Brand: COATED VICRYL

## (undated) DEVICE — 3M™ STERI-STRIP™ REINFORCED ADHESIVE SKIN CLOSURES, R1547, 1/2 IN X 4 IN (12 MM X 100 MM), 6 STRIPS/ENVELOPE: Brand: 3M™ STERI-STRIP™

## (undated) DEVICE — SUTURE PDS II 3-0 SH

## (undated) DEVICE — DRAPE SRG UNV 131X90IN LWR

## (undated) DEVICE — YANKAUER SUCTION INSTRUMENT NO CONTROL VENT, BULB TIP, CLEAR: Brand: YANKAUER

## (undated) DEVICE — PACK SRG UNV 1 STRL LF

## (undated) DEVICE — ADHESIVE MASTISOL 2/3CC VL

## (undated) DEVICE — IMPERVIOUS STOCKINETTE: Brand: DEROYAL

## (undated) DEVICE — TOWEL SURG OR 17X30IN BLUE

## (undated) DEVICE — LAPAROTOMY: Brand: MEDLINE INDUSTRIES, INC.

## (undated) DEVICE — COVER PRB NEOGUARD 30X2.6CM US

## (undated) DEVICE — SUTURE ETHILON 3-0 PS-2

## (undated) DEVICE — SUTURE ETHIBOND EXCEL 2-0 SH

## (undated) DEVICE — 3M™ TEGADERM™ TRANSPARENT FILM DRESSING, 1626W, 4 IN X 4-3/4 IN (10 CM X 12 CM), 50 EACH/CARTON, 4 CARTON/CASE: Brand: 3M™ TEGADERM™

## (undated) DEVICE — CLIP MED INTNL HMCLP TNTLM

## (undated) DEVICE — DRAPE SHEET LAPAROTOMY

## (undated) DEVICE — PROXIMATE RH ROTATING HEAD SKIN STAPLERS (35 WIDE) CONTAINS 35 STAINLESS STEEL STAPLES: Brand: PROXIMATE

## (undated) DEVICE — SUTURE SILK 3-0 SH

## (undated) DEVICE — SUTURE VICRYL 4-0 J494G

## (undated) DEVICE — DRAPE SHEET LG

## (undated) DEVICE — 60 ML SYRINGE LUER-LOCK TIP: Brand: MONOJECT

## (undated) DEVICE — SUTURE VICRYL 2-0 SH

## (undated) DEVICE — HEX-LOCKING BLADE ELECTRODE: Brand: EDGE

## (undated) DEVICE — APPLIER LICACLIP MCA MED 23.8

## (undated) DEVICE — SPONGE: SPECIALTY PEANUT XR 100/CS: Brand: MEDICAL ACTION INDUSTRIES

## (undated) DEVICE — SOL  .9 1000ML BTL

## (undated) DEVICE — FLEXIBLE YANKAUER,MEDIUM TIP, NO VACUUM CONTROL: Brand: ARGYLE

## (undated) DEVICE — GAMMEX® PI HYBRID SIZE 7, STERILE POWDER-FREE SURGICAL GLOVE, POLYISOPRENE AND NEOPRENE BLEND: Brand: GAMMEX

## (undated) DEVICE — SUTURE SILK 2-0 SH

## (undated) NOTE — LETTER
Patient Name: Jerald Cardenas  YOB: 1975          MRN number:  [id number]  Date:  4/19/2018  Referring Physician:  Josiah Cullen          Physical Therapy  EVALUATION:    Referring Physician: Dr. Jenene Cabot  Diagnosis: s/p lumbar fusion  Date Today’s Treatment and Response:  Patient education provided on eval findings and treatment expectations and precautions.  Advised re: options for return to fitness program: beginning with circuit ex at low resistance and with supportive seating, elliptical I certify the need for these services furnished under this plan of treatment and while under my care.     X___________________________________________________ Date____________________    Certification From: 0/80/4481  To:7/18/2018

## (undated) NOTE — MR AVS SNAPSHOT
After Visit Summary   4/5/2017    Renee Alexate    MRN: [id number]           Diagnoses this Visit     CML (chronic myeloid leukemia) ([facility]    -  Primary       Allergies     No Known Allergies      Your Vital Signs Were     BP Pulse Temp(Src) Re Procedure                               Abnormality         Status                     ---------                               -----------         ------                     CBC W/ DIFFERENTIAL[[id number]]          Abnormal            Final result are present that are not detected by this test (p190 or p230   forms). This result has been reviewed and approved by Soledad Villeda M.D.   INTERPRETIVE INFORMATION: BCR-ABL1, Major (p210), Quantitative    INTERPRETATION  This assay quantifies BCR-ABL1 trans percent IS. This assay does not detect transcripts resulting from   a rare BCR-ABL1 rearrangement with a BCR exon 19 breakpoint that   results in the p230 fusion protein.   The results of this test must   always be interpreted in the context of morphologic Monocyte % 10.7   % Final    Eosinophil % 1.7   % Final    Basophil % 0.5   % Final    Immature Granulocyte % 1.0   % Final         Result Summary for POCT ISTAT CHEM8 CARTRIDGE      Component Results     Component Value Flag Ref Range Units Status    IST

## (undated) NOTE — ED AVS SNAPSHOT
[facility]  Cony Cm    [address] [place] [address]    Phone:  [phone]    Fax:  [phone]           Merari Monaco   MRN: [id number]    Department:  THE [facility]   Date of Visit [address]  [phone] [address] [place] [address], [facility]   [phone]       To Check ER Wait Times:  TEXT 'Emily Davis' to 26636      Click [email or web address]      Or call [phone]    If you have phone number before you leave. After you leave, you should follow the attached instructions. I have read and understand the instructions given to me by my caregivers.         [facility]        Pharmacy Address Phone Number   [phone] 02/27/17 13:46:11    Final result    Impression:    CONCLUSION:  No acute fracture. Please see details as above.            Dictated by: Odalys Betts MD on 2/27/2017 at 13:44       Approved by: Odalys Betts MD              Narrative:    PROCEDURE:  XR ANKLE (MIN

## (undated) NOTE — LETTER
Patient Name: Olman Deluca  YOB: 1975          MRN number:  [id number]  Date:  5/31/2018  Referring Physician:  Giuseppe Farnsworth     Discharge Summary    Pt has attended 8 visits in Physical Therapy.      Subjective: feeling good, sleeping

## (undated) NOTE — MR AVS SNAPSHOT
[facility] [address] [place] [address] [address] [facility]  Romaine Nuno [phone]               Thank you for choosing us for your health care visit with Fernando Winslow MD.  We are glad to serve you and happy to provide you wit Visits < Visit Summaries. MyChart questions? Call [phone] for help. [facility]art is NOT to be used for urgent needs. For medical emergencies, dial 911.            Visit [facility] online at  [email or web address]